# Patient Record
Sex: FEMALE | Race: WHITE | HISPANIC OR LATINO | Employment: FULL TIME | URBAN - METROPOLITAN AREA
[De-identification: names, ages, dates, MRNs, and addresses within clinical notes are randomized per-mention and may not be internally consistent; named-entity substitution may affect disease eponyms.]

---

## 2018-11-08 ENCOUNTER — OFFICE VISIT (OUTPATIENT)
Dept: MEDICAL GROUP | Facility: MEDICAL CENTER | Age: 48
End: 2018-11-08
Payer: COMMERCIAL

## 2018-11-08 VITALS
WEIGHT: 130.51 LBS | SYSTOLIC BLOOD PRESSURE: 118 MMHG | HEIGHT: 65 IN | HEART RATE: 68 BPM | TEMPERATURE: 98.8 F | DIASTOLIC BLOOD PRESSURE: 72 MMHG | BODY MASS INDEX: 21.74 KG/M2 | OXYGEN SATURATION: 99 %

## 2018-11-08 DIAGNOSIS — Z00.00 ANNUAL PHYSICAL EXAM: ICD-10-CM

## 2018-11-08 DIAGNOSIS — Z12.11 SCREENING FOR MALIGNANT NEOPLASM OF COLON: ICD-10-CM

## 2018-11-08 DIAGNOSIS — Z13.6 SCREENING FOR CARDIOVASCULAR CONDITION: ICD-10-CM

## 2018-11-08 DIAGNOSIS — Z13.1 SCREENING FOR DIABETES MELLITUS: ICD-10-CM

## 2018-11-08 DIAGNOSIS — Z00.00 HEALTH CARE MAINTENANCE: ICD-10-CM

## 2018-11-08 DIAGNOSIS — Z12.31 ENCOUNTER FOR SCREENING MAMMOGRAM FOR MALIGNANT NEOPLASM OF BREAST: ICD-10-CM

## 2018-11-08 PROCEDURE — 99386 PREV VISIT NEW AGE 40-64: CPT | Performed by: INTERNAL MEDICINE

## 2018-11-08 ASSESSMENT — PATIENT HEALTH QUESTIONNAIRE - PHQ9: CLINICAL INTERPRETATION OF PHQ2 SCORE: 0

## 2018-11-08 NOTE — PROGRESS NOTES
CHIEF COMPLIANT:  Diane Goins is a 47 y.o. female who presents for annual exam    Recommendations:  Regular exercise at least 4 days a week  Diet: advised balanced diet  Dental exam at least 1-2 times per year  Sunscreen use: advised    Immunizations:  TdaP: Pending records  Influenza vaccine: Advised    Colonoscopy: Pending in 2019    GYN  PAP:   Pending  Abnormal PAP: no  Last Mammography: Pending    Menarche:      Menses every month with bleeding for 3-4 days  No excess cramping or bleeding.   Takes OTC analgesics for cramping    CURRENT MEDICATIONS  No current outpatient prescriptions on file.     No current facility-administered medications for this visit.      ALLERGIES  Allergies: Patient has no known allergies.  PAST MEDICAL HISTORY  She  has a past medical history of No known health problems.  SURGICAL HISTORY  She  has no past surgical history on file.  SOCIAL HISTORY  Social History   Substance Use Topics   • Smoking status: Former Smoker     Packs/day: 1.50     Types: Cigarettes     Start date: 1986     Quit date: 2010   • Smokeless tobacco: Never Used   • Alcohol use Not on file     Social History     Social History Narrative   • No narrative on file     FAMILY HISTORY  Family History   Problem Relation Age of Onset   • Cancer Father      Family Status   Relation Status   • Mo Alive   • Fa    • Sis Alive   • Bro Alive   • Child Alive     REVIEW OF SYSTEMS  Constitutional: Denies fever, chills, or sweats  Skin: negative for rash, scaling, itching, pigmentation, hair or nail changes.  Eyes: negative for visual blurring, double vision, eye pain, floaters and discharge from eyes  ENT: negative for tinnitus, vertigo, bleeding gums, dental problem and hoarseness, frequent URI's, sinus trouble, persistent sore throat  Respiratory: negative for persistent cough, hemoptysis, dyspnea, recurrent pneumonia or bronchitis, asthma and wheezing  Cardiovascular: negative for palpitations,  "tachycardia, irregular heart beat, chest pain, or peripheral edema.  Gastrointestinal: negative for poor appetite, dysphagia, nausea, heartburn or reflux, abdominal pain, hemorrhoids, constipation or diarrhea  Genitourinary: negative for dysuria, frequency, hesitancy, incontinence, sexually transmitted disease, abnormal vaginal discharge/bleeding, dysparunia   Musculoskeletal: negative for joint swelling and muscle pain/ soreness  Neuro: negative for migraine headaches, involuntary movements or tremor  Psychiatric: negative for excessive alcohol consumption or illegal drug use, sleep problems, anxiety, depression, sexual difficulties  Hematologic/Lymphatic/Immunologic: negative for anemia, unusual bruising, swollen glands, HIV risk factors  Endocrine: negative for temperature intolerance, polydipsia, polyuria.     PHYSICAL EXAMINATION:  Blood pressure 118/72, pulse 68, temperature 37.1 °C (98.8 °F), temperature source Temporal, height 1.651 m (5' 5\"), weight 59.2 kg (130 lb 8.2 oz), last menstrual period 10/08/2018, SpO2 99 %.  Body mass index is 21.72 kg/m².  Wt Readings from Last 4 Encounters:   11/08/18 59.2 kg (130 lb 8.2 oz)     General appearance:healthy, well developed, well nourished, well-groomed  Psych: alert, no distress, cooperative. Eye contact good, speech goal directed. Affect calm.   Eyes: conjunctivae and sclerae normal, lids and lashes normal, EOMI, PERRLA  ENT: Ears: external ears normal to inspection, canals clear. TMs pearly gray with normal light reflex and anatomic landmarks, Nose/Sinuses: nose shows no deformity, asymmetry, or inflammation, nasal mucosa normal, no sinus tenderness,   Oropharynx: lips normal without lesions, buccal mucosa normal, gums healthy, teeth intact, non-carious, palate normal, tongue midline and normal  Neck: no asymmetry, masses, adenopathy. Thyroid normal to palpation, carotids without bruits, no jugular venous distention  Lungs: clear to auscultation with good " excursion, Normal respiratory rate. Chest symmetric no chest wall tenderness.  Cardiovascular: Regular rate and rhythm, no murmur.  No peripheral edema  Abdomen:  Soft, non-tender, no masses, HSM or palpable renal abnormalities. Bowel sounds normal, no bruits heard. No CVAT.  Musculoskeletal: no evidence of joint effusion, ROM of all joints is normal, no crepitation detected, strength grossly normal UE and LE, proximal and distal motor groups. No deformities present  Lymphatic: None significantly enlarged supraclavicular, axillary, or inguinal  Skin: color normal, vascularity normal, no rashes or suspicious lesions, no evidence of bleeding or bruising  Neuro: speech normal, mental status intact, gait grossly normal, muscle tone normal.    LABS    Pending    ASSESSMENT/PLAN    1. Annual physical exam  Patient has not have physician for 20 years    2. Health care maintenance  She will schedule appointment for Pap smear  - COMP METABOLIC PANEL; Future  - Lipid Profile; Future  - VITAMIN D,25 HYDROXY; Future  - TSH; Future    3. Encounter for screening mammogram for malignant neoplasm of breast  - MA-SCREEN MAMMO W/CAD-BILAT; Future    4. Screening for malignant neoplasm of colon  During January 2019  - REFERRAL TO GASTROENTEROLOGY    5. Screening for cardiovascular condition  - Lipid Profile; Future    6. Screening for diabetes mellitus  - COMP METABOLIC PANEL; Future    - Advised to check with insurance about the co-pay for labs    Smoking Counseling: Nonsmoker    Next office visit within 2 months for Pap    All questions are answered.     Please note that this dictation was created using voice recognition software. Despite all efforts, some minor grammar mistakes are possible.

## 2018-11-12 ENCOUNTER — HOSPITAL ENCOUNTER (OUTPATIENT)
Dept: RADIOLOGY | Facility: MEDICAL CENTER | Age: 48
End: 2018-11-12
Attending: INTERNAL MEDICINE
Payer: COMMERCIAL

## 2018-11-12 DIAGNOSIS — Z12.31 ENCOUNTER FOR SCREENING MAMMOGRAM FOR MALIGNANT NEOPLASM OF BREAST: ICD-10-CM

## 2018-11-12 PROCEDURE — 77067 SCR MAMMO BI INCL CAD: CPT

## 2018-12-10 ENCOUNTER — HOSPITAL ENCOUNTER (OUTPATIENT)
Dept: LAB | Facility: MEDICAL CENTER | Age: 48
End: 2018-12-10
Attending: INTERNAL MEDICINE
Payer: COMMERCIAL

## 2018-12-10 DIAGNOSIS — Z13.6 SCREENING FOR CARDIOVASCULAR CONDITION: ICD-10-CM

## 2018-12-10 DIAGNOSIS — Z00.00 HEALTH CARE MAINTENANCE: ICD-10-CM

## 2018-12-10 DIAGNOSIS — Z13.1 SCREENING FOR DIABETES MELLITUS: ICD-10-CM

## 2018-12-10 PROCEDURE — 80061 LIPID PANEL: CPT

## 2018-12-10 PROCEDURE — 84443 ASSAY THYROID STIM HORMONE: CPT

## 2018-12-10 PROCEDURE — 82306 VITAMIN D 25 HYDROXY: CPT

## 2018-12-10 PROCEDURE — 80053 COMPREHEN METABOLIC PANEL: CPT

## 2018-12-10 PROCEDURE — 36415 COLL VENOUS BLD VENIPUNCTURE: CPT

## 2018-12-11 LAB
25(OH)D3 SERPL-MCNC: 11 NG/ML (ref 30–100)
ALBUMIN SERPL BCP-MCNC: 4.1 G/DL (ref 3.2–4.9)
ALBUMIN/GLOB SERPL: 1.5 G/DL
ALP SERPL-CCNC: 47 U/L (ref 30–99)
ALT SERPL-CCNC: 10 U/L (ref 2–50)
ANION GAP SERPL CALC-SCNC: 6 MMOL/L (ref 0–11.9)
AST SERPL-CCNC: 16 U/L (ref 12–45)
BILIRUB SERPL-MCNC: 0.5 MG/DL (ref 0.1–1.5)
BUN SERPL-MCNC: 12 MG/DL (ref 8–22)
CALCIUM SERPL-MCNC: 9 MG/DL (ref 8.5–10.5)
CHLORIDE SERPL-SCNC: 104 MMOL/L (ref 96–112)
CHOLEST SERPL-MCNC: 209 MG/DL (ref 100–199)
CO2 SERPL-SCNC: 28 MMOL/L (ref 20–33)
CREAT SERPL-MCNC: 0.71 MG/DL (ref 0.5–1.4)
FASTING STATUS PATIENT QL REPORTED: NORMAL
GLOBULIN SER CALC-MCNC: 2.7 G/DL (ref 1.9–3.5)
GLUCOSE SERPL-MCNC: 92 MG/DL (ref 65–99)
HDLC SERPL-MCNC: 59 MG/DL
LDLC SERPL CALC-MCNC: 135 MG/DL
POTASSIUM SERPL-SCNC: 4.3 MMOL/L (ref 3.6–5.5)
PROT SERPL-MCNC: 6.8 G/DL (ref 6–8.2)
SODIUM SERPL-SCNC: 138 MMOL/L (ref 135–145)
TRIGL SERPL-MCNC: 74 MG/DL (ref 0–149)
TSH SERPL DL<=0.005 MIU/L-ACNC: 2.37 UIU/ML (ref 0.38–5.33)

## 2018-12-19 ENCOUNTER — HOSPITAL ENCOUNTER (OUTPATIENT)
Facility: MEDICAL CENTER | Age: 48
End: 2018-12-19
Attending: INTERNAL MEDICINE
Payer: COMMERCIAL

## 2018-12-19 ENCOUNTER — OFFICE VISIT (OUTPATIENT)
Dept: MEDICAL GROUP | Facility: MEDICAL CENTER | Age: 48
End: 2018-12-19
Payer: COMMERCIAL

## 2018-12-19 VITALS
DIASTOLIC BLOOD PRESSURE: 68 MMHG | SYSTOLIC BLOOD PRESSURE: 118 MMHG | WEIGHT: 136.24 LBS | TEMPERATURE: 97.9 F | BODY MASS INDEX: 22.7 KG/M2 | HEART RATE: 78 BPM | HEIGHT: 65 IN | RESPIRATION RATE: 14 BRPM | OXYGEN SATURATION: 98 %

## 2018-12-19 DIAGNOSIS — E78.00 HYPERCHOLESTEROLEMIA: ICD-10-CM

## 2018-12-19 DIAGNOSIS — Z23 NEED FOR VACCINATION: ICD-10-CM

## 2018-12-19 DIAGNOSIS — Z12.11 SCREENING FOR MALIGNANT NEOPLASM OF COLON: ICD-10-CM

## 2018-12-19 DIAGNOSIS — Z00.00 HEALTH CARE MAINTENANCE: ICD-10-CM

## 2018-12-19 DIAGNOSIS — Z12.39 SCREENING FOR MALIGNANT NEOPLASM OF BREAST: ICD-10-CM

## 2018-12-19 DIAGNOSIS — Z12.4 SCREENING FOR MALIGNANT NEOPLASM OF CERVIX: ICD-10-CM

## 2018-12-19 DIAGNOSIS — E55.9 HYPOVITAMINOSIS D: ICD-10-CM

## 2018-12-19 DIAGNOSIS — Z01.419 WELL FEMALE EXAM WITH ROUTINE GYNECOLOGICAL EXAM: ICD-10-CM

## 2018-12-19 PROCEDURE — 90686 IIV4 VACC NO PRSV 0.5 ML IM: CPT | Performed by: INTERNAL MEDICINE

## 2018-12-19 PROCEDURE — 90715 TDAP VACCINE 7 YRS/> IM: CPT | Performed by: INTERNAL MEDICINE

## 2018-12-19 PROCEDURE — 90471 IMMUNIZATION ADMIN: CPT | Performed by: INTERNAL MEDICINE

## 2018-12-19 PROCEDURE — 99000 SPECIMEN HANDLING OFFICE-LAB: CPT | Performed by: INTERNAL MEDICINE

## 2018-12-19 PROCEDURE — 99396 PREV VISIT EST AGE 40-64: CPT | Performed by: INTERNAL MEDICINE

## 2018-12-19 PROCEDURE — 99214 OFFICE O/P EST MOD 30 MIN: CPT | Mod: 25 | Performed by: INTERNAL MEDICINE

## 2018-12-19 PROCEDURE — 88175 CYTOPATH C/V AUTO FLUID REDO: CPT

## 2018-12-19 PROCEDURE — 87624 HPV HI-RISK TYP POOLED RSLT: CPT

## 2018-12-19 PROCEDURE — 90472 IMMUNIZATION ADMIN EACH ADD: CPT | Performed by: INTERNAL MEDICINE

## 2018-12-19 RX ORDER — ERGOCALCIFEROL 1.25 MG/1
50000 CAPSULE ORAL
Qty: 12 CAP | Refills: 1 | Status: SHIPPED | OUTPATIENT
Start: 2018-12-19 | End: 2019-05-23 | Stop reason: SDUPTHER

## 2018-12-19 NOTE — PROGRESS NOTES
CHIEF COMPLIANT:  Diane Goins is a 47 y.o. female who presents to review labs for annual exam    Pt has GYN provider: no    Recommendations:  Regular exercise at least 4 days a week  Diet: advised balanced diet  Dental exam at least 1-2 times per year  Sunscreen use: advised    Immunizations:  TdaP: 2018  Flu vaccine: 2018Influenza    Colonoscopy: Given order  Bone density test:   Gardiasil:     GYN  Last PAP:   remote, normal   Abnormal PAP: no  Last Mammography: 2018  Self breast exam: advised    Postmenopausal.  Denies hot flushes, sweating, vaginal dryness, mood swings.     Hypercholesterolemia  New problem.  The patient had slightly elevated cholesterol, no medications.   Diet: Healthy  Exercise: At least 4 days in a week  BMI: 22  FH: Adopted    Hypovitaminosis D  New problem.  The patient had a low vitamin D level at 11.   No vitamin D supplement.    CURRENT MEDICATIONS  Current Outpatient Prescriptions   Medication Sig Dispense Refill   • vitamin D, Ergocalciferol, (DRISDOL) 11933 units Cap capsule Take 1 Cap by mouth every 7 days. 12 Cap 1     No current facility-administered medications for this visit.      ALLERGIES  Allergies: Patient has no known allergies.  PAST MEDICAL HISTORY  She  has a past medical history of No known health problems.  SURGICAL HISTORY  She  has no past surgical history on file.  SOCIAL HISTORY  Social History   Substance Use Topics   • Smoking status: Former Smoker     Packs/day: 1.50     Types: Cigarettes     Start date: 1986     Quit date: 2010   • Smokeless tobacco: Never Used   • Alcohol use Not on file     Social History     Social History Narrative   • No narrative on file     FAMILY HISTORY  Family History   Problem Relation Age of Onset   • Cancer Father      Family Status   Relation Status   • Mo Alive   • Fa    • Sis Alive   • Bro Alive   • Child Alive     REVIEW OF SYSTEMS  Constitutional: Denies fever, chills, or sweats  Skin:  "negative for rash, scaling, itching, pigmentation, hair or nail changes.  Eyes: negative for visual blurring, double vision, eye pain, floaters and discharge from eyes  ENT: negative for tinnitus, vertigo, bleeding gums, dental problem and hoarseness, frequent URI's, sinus trouble, persistent sore throat  Respiratory: negative for persistent cough, hemoptysis, dyspnea, recurrent pneumonia or bronchitis, asthma and wheezing  Cardiovascular: negative for palpitations, tachycardia, irregular heart beat, chest pain, or peripheral edema.  Gastrointestinal: negative for poor appetite, dysphagia, nausea, heartburn or reflux, abdominal pain, hemorrhoids, constipation or diarrhea  Genitourinary: negative for dysuria, frequency, hesitancy, incontinence, sexually transmitted disease, abnormal vaginal discharge/bleeding, dysparunia   Musculoskeletal: negative for joint swelling and muscle pain/ soreness  Neuro: negative for migraine headaches, involuntary movements or tremor  Psychiatric: negative for excessive alcohol consumption or illegal drug use, sleep problems, anxiety, depression, sexual difficulties  Hematologic/Lymphatic/Immunologic: negative for anemia, unusual bruising, swollen glands, HIV risk factors  Endocrine: negative for temperature intolerance, polydipsia, polyuria.     PHYSICAL EXAMINATION:  Blood pressure 118/68, pulse 78, temperature 36.6 °C (97.9 °F), temperature source Temporal, resp. rate 14, height 1.651 m (5' 5\"), weight 61.8 kg (136 lb 3.9 oz), last menstrual period 10/15/2018, SpO2 98 %.  Body mass index is 22.67 kg/m².  Wt Readings from Last 4 Encounters:   12/19/18 61.8 kg (136 lb 3.9 oz)   11/08/18 59.2 kg (130 lb 8.2 oz)   General appearance:healthy, well developed, well nourished, well-groomed  Psych: alert, no distress, cooperative. Eye contact good, speech goal directed. Affect calm.   Eyes: conjunctivae and sclerae normal, lids and lashes normal, EOMI, PERRLA  ENT: Ears: external ears normal " to inspection, canals clear. TMs pearly gray with normal light reflex and anatomic landmarks, Nose/Sinuses: nose shows no deformity, asymmetry, or inflammation, nasal mucosa normal, no sinus tenderness,   Oropharynx: lips normal without lesions, buccal mucosa normal, gums healthy, teeth intact, non-carious, palate normal, tongue midline and normal  Neck: no asymmetry, masses, adenopathy. Thyroid normal to palpation, carotids without bruits, no jugular venous distention  Lungs: clear to auscultation with good excursion, Normal respiratory rate. Chest symmetric no chest wall tenderness.  Cardiovascular: Regular rate and rhythm, no murmur.  No peripheral edema  Abdomen:  Soft, non-tender, no masses, HSM or palpable renal abnormalities. Bowel sounds normal, no bruits heard. No CVAT.  Musculoskeletal: no evidence of joint effusion, ROM of all joints is normal, no crepitation detected, strength grossly normal UE and LE, proximal and distal motor groups. No deformities present  Lymphatic: None significantly enlarged supraclavicular, axillary, or inguinal  Skin: color normal, vascularity normal, no rashes or suspicious lesions, no evidence of bleeding or bruising  Neuro: speech normal, mental status intact, gait grossly normal, muscle tone normal.  GYN: No suprapubic tenderness.  Perineum and external genitalia normal without rash.   Vagina with without discharge, normal mucosa.  Cervix w/o visible lesions or discharge. No CMT  Uterus normal size, non-tender, no masses,   Adnexa w/o mass or tenderness.   PAP smear was obtained.    LABS    Labs are reviewed and discussed with a patient  Lab Results   Component Value Date/Time    CHOLSTRLTOT 209 (H) 12/10/2018 08:08 AM     (H) 12/10/2018 08:08 AM    HDL 59 12/10/2018 08:08 AM    TRIGLYCERIDE 74 12/10/2018 08:08 AM       Lab Results   Component Value Date/Time    SODIUM 138 12/10/2018 08:08 AM    POTASSIUM 4.3 12/10/2018 08:08 AM    CHLORIDE 104 12/10/2018 08:08 AM     CO2 28 12/10/2018 08:08 AM    GLUCOSE 92 12/10/2018 08:08 AM    BUN 12 12/10/2018 08:08 AM    CREATININE 0.71 12/10/2018 08:08 AM     Lab Results   Component Value Date/Time    ALKPHOSPHAT 47 12/10/2018 08:08 AM    ASTSGOT 16 12/10/2018 08:08 AM    ALTSGPT 10 12/10/2018 08:08 AM    TBILIRUBIN 0.5 12/10/2018 08:08 AM       Ref. Range 12/10/2018    25-OF  Vitamin D 25 30 - 100 ng/mL 11 (L)   TSH  0.380 - 5.330 uIU/mL 2.370     ASSESSMENT/PLAN    1. Well female exam with routine gynecological exam  - THINPREP PAP WITH HPV; Future    2. Screening for malignant neoplasm of cervix  - THINPREP PAP WITH HPV; Future    3. Screening for malignant neoplasm of breast  - MA-SCREEN MAMMO W/CAD-BILAT; Future    4. Screening for malignant neoplasm of colon  - REFERRAL TO GASTROENTEROLOGY    5. Need for vaccination  - Flu Quad Inj >3 Year Pre-Filled (Preservative Free)  - Tdap =>6yo IM  Information was provided to the patient regarding the vaccine, including side effects. Vaccine was given by my medical assistant under my supervision.    6. Health care maintenance  As above    7. Hypercholesterolemia  Advised low-calorie diet, daily exercise, weight control  - COMP METABOLIC PANEL; Future  - Lipid Profile; Future    8. Hypovitaminosis D  Start:  - vitamin D, Ergocalciferol, (DRISDOL) 10142 units Cap capsule; Take 1 Cap by mouth every 7 days.  Dispense: 12 Cap; Refill: 1    Smoking Counseling: Nonsmoker    Next office visit is due in  1 year and prn    All questions are answered.     Please note that this dictation was created using voice recognition software. Despite all efforts, some minor grammar mistakes are possible.

## 2018-12-20 DIAGNOSIS — Z12.4 SCREENING FOR MALIGNANT NEOPLASM OF CERVIX: ICD-10-CM

## 2018-12-20 DIAGNOSIS — Z01.419 WELL FEMALE EXAM WITH ROUTINE GYNECOLOGICAL EXAM: ICD-10-CM

## 2018-12-21 LAB
CYTOLOGY REG CYTOL: NORMAL
HPV HR 12 DNA CVX QL NAA+PROBE: NEGATIVE
HPV16 DNA SPEC QL NAA+PROBE: NEGATIVE
HPV18 DNA SPEC QL NAA+PROBE: NEGATIVE
SPECIMEN SOURCE: NORMAL

## 2019-05-23 DIAGNOSIS — E55.9 HYPOVITAMINOSIS D: ICD-10-CM

## 2019-05-23 RX ORDER — ERGOCALCIFEROL 1.25 MG/1
CAPSULE ORAL
Qty: 4 CAP | Refills: 0 | Status: SHIPPED | OUTPATIENT
Start: 2019-05-23 | End: 2019-06-17 | Stop reason: SDUPTHER

## 2019-06-06 ENCOUNTER — OFFICE VISIT (OUTPATIENT)
Dept: MEDICAL GROUP | Facility: MEDICAL CENTER | Age: 49
End: 2019-06-06
Payer: COMMERCIAL

## 2019-06-06 VITALS
HEART RATE: 67 BPM | BODY MASS INDEX: 23.62 KG/M2 | DIASTOLIC BLOOD PRESSURE: 70 MMHG | OXYGEN SATURATION: 97 % | SYSTOLIC BLOOD PRESSURE: 116 MMHG | TEMPERATURE: 98.7 F | HEIGHT: 65 IN | WEIGHT: 141.76 LBS
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
## 2019-06-06 DIAGNOSIS — M62.838 MUSCLE SPASM: ICD-10-CM

## 2019-06-06 DIAGNOSIS — M79.10 MYALGIA: ICD-10-CM

## 2019-06-06 PROCEDURE — 99214 OFFICE O/P EST MOD 30 MIN: CPT | Performed by: INTERNAL MEDICINE

## 2019-06-06 RX ORDER — PREDNISONE 20 MG/1
TABLET ORAL
Qty: 7 TAB | Refills: 0 | Status: SHIPPED | OUTPATIENT
Start: 2019-06-06 | End: 2019-09-20

## 2019-06-06 RX ORDER — TIZANIDINE 4 MG/1
4 TABLET ORAL EVERY 6 HOURS PRN
Qty: 30 TAB | Refills: 3 | Status: SHIPPED | OUTPATIENT
Start: 2019-06-06 | End: 2019-09-20

## 2019-06-06 RX ORDER — MELOXICAM 15 MG/1
15 TABLET ORAL DAILY
Qty: 60 TAB | Refills: 0 | Status: SHIPPED | OUTPATIENT
Start: 2019-06-06 | End: 2019-09-20

## 2019-06-06 ASSESSMENT — PATIENT HEALTH QUESTIONNAIRE - PHQ9: CLINICAL INTERPRETATION OF PHQ2 SCORE: 0

## 2019-06-06 NOTE — PROGRESS NOTES
CHIEF COMPLAINT  Chief Complaint   Patient presents with   • Arm Pain     x 6 months     HPI  Patient is a 48 y.o. female patient who presents today for the following     Muscle pain in R shoulder  - Onset: 12/2018, ~ 6 months  - Triger: strenuous exercise  - located in: muscles pf the R shoulder/upper lateral arm.  - intensity:  Up to 9/10  - quality:  dull and sharp   - radiation:  no  - alleviating factors are:  rest, ibuprofen ocasionally  -  exacerbating factors are:  activity  - accompanied: no numbness, weakness, tingling, fever, chills  - course:   - imaging:   - treatment: as above    Reviewed PMH, PSH, FH, SH, ALL, HCM/IMM, no changes  Reviewed MEDS, no changes    Patient Active Problem List    Diagnosis Date Noted   • Hypercholesterolemia 12/19/2018   • Hypovitaminosis D 12/19/2018   • Annual physical exam 11/08/2018   • Health care maintenance 11/08/2018     CURRENT MEDICATIONS  Current Outpatient Prescriptions   Medication Sig Dispense Refill   • meloxicam (MOBIC) 15 MG tablet Take 1 Tab by mouth every day. 60 Tab 0   • tizanidine (ZANAFLEX) 4 MG Tab Take 1 Tab by mouth every 6 hours as needed. 30 Tab 3   • predniSONE (DELTASONE) 20 MG Tab Take 1 tab daily after breakfast 7 Tab 0   • vitamin D, Ergocalciferol, (DRISDOL) 84445 units Cap capsule TAKE ONE CAPSULE BY MOUTH EVERY 7 DAYS 4 Cap 0     No current facility-administered medications for this visit.      ALLERGIES  Allergies: Patient has no known allergies.  PAST MEDICAL HISTORY  Past Medical History:   Diagnosis Date   • No known health problems      SURGICAL HISTORY  She  has no past surgical history on file.  SOCIAL HISTORY  Social History   Substance Use Topics   • Smoking status: Former Smoker     Packs/day: 1.50     Types: Cigarettes     Start date: 11/8/1986     Quit date: 11/8/2010   • Smokeless tobacco: Never Used   • Alcohol use No     Social History     Social History Narrative   • No narrative on file     FAMILY HISTORY  Family History  "  Problem Relation Age of Onset   • Adopted: Yes   • Cancer Father      Family Status   Relation Status   • Mo Alive   • Fa    • Sis Alive   • Bro Alive   • Child Alive     ROS   Constitutional: Negative for fever, chills.  Eyes: Negative for blurred vision.   Respiratory: Negative for cough, shortness of breath.  Cardiovascular: Negative for chest pain, palpitations.   Gastrointestinal: Negative for heartburn, nausea, abdominal pain.   Musculoskeletal: as above.  Skin: Negative for rash.   Neuro: Negative for dizziness, weakness and headaches.   Endo/Heme/Allergies: Does not bruise/bleed easily.   Psychiatric/Behavioral: Negative for depression.    PHYSICAL EXAM   /70 (BP Location: Left arm, Patient Position: Sitting, BP Cuff Size: Adult)   Pulse 67   Temp 37.1 °C (98.7 °F) (Temporal)   Ht 1.651 m (5' 5\")   Wt 64.3 kg (141 lb 12.1 oz)   SpO2 97%  Body mass index is 23.59 kg/m².  General:  NAD, well appearing  HEENT:   NC/AT, PERRLA, EOMI, TMs are clear. Oropharyngeal mucosa is pink,  without lesions;  no cervical / supraclavicular  lymphadenopathy, no thyromegaly.    Cardiovascular: RRR.   No m/r/g. No carotid bruits .      Lungs:   CTAB, no w/r/r, no respiratory distress.  Abdomen: Soft, NT/ND + BS; no suprapubic tenderness; no masses or hepatosplenomegaly.  Extremities:  2+ DP and radial pulses bilaterally.  No c/c/e.   Right shoulder: Decreased posterior rotation in the right shoulder due to pain.  No redness or swelling. TTP over the R triceps.  Skin:  Warm, dry.  No erythema. No rash.   Neurologic: Alert & oriented x 3. CN II-XII grossly intact. Brachioradialis / knee DTR are 2/4, symmetric. Strength and sensation grossly intact.  No focal deficits.  Psychiatric:  Affect normal, mood normal, judgment normal.    LABS     Labs are reviewed and discussed with a patient  Lab Results   Component Value Date/Time    CHOLSTRLTOT 209 (H) 12/10/2018 08:08 AM     (H) 12/10/2018 08:08 AM    HDL " 59 12/10/2018 08:08 AM    TRIGLYCERIDE 74 12/10/2018 08:08 AM       Lab Results   Component Value Date/Time    SODIUM 138 12/10/2018 08:08 AM    POTASSIUM 4.3 12/10/2018 08:08 AM    CHLORIDE 104 12/10/2018 08:08 AM    CO2 28 12/10/2018 08:08 AM    GLUCOSE 92 12/10/2018 08:08 AM    BUN 12 12/10/2018 08:08 AM    CREATININE 0.71 12/10/2018 08:08 AM     Lab Results   Component Value Date/Time    ALKPHOSPHAT 47 12/10/2018 08:08 AM    ASTSGOT 16 12/10/2018 08:08 AM    ALTSGPT 10 12/10/2018 08:08 AM    TBILIRUBIN 0.5 12/10/2018 08:08 AM      IMAGING     None    ASSESMENT AND PLAN        1. Myalgia  Advised to continue activity as tolerated  - REFERRAL TO PHYSICAL THERAPY Reason for Therapy: Eval/Treat/Report  - meloxicam (MOBIC) 15 MG tablet; Take 1 Tab by mouth every day.  Dispense: 60 Tab; Refill: 0  - tizanidine (ZANAFLEX) 4 MG Tab; Take 1 Tab by mouth every 6 hours as needed.  Dispense: 30 Tab; Refill: 3  2. Muscle spasm  - REFERRAL TO PHYSICAL THERAPY Reason for Therapy: Eval/Treat/Report  - meloxicam (MOBIC) 15 MG tablet; Take 1 Tab by mouth every day.  Dispense: 60 Tab; Refill: 0  - tizanidine (ZANAFLEX) 4 MG Tab; Take 1 Tab by mouth every 6 hours as needed.  Dispense: 30 Tab; Refill: 3  3. Overuse injury  - REFERRAL TO PHYSICAL THERAPY Reason for Therapy: Eval/Treat/Report  - meloxicam (MOBIC) 15 MG tablet; Take 1 Tab by mouth every day.  Dispense: 60 Tab; Refill: 0  - tizanidine (ZANAFLEX) 4 MG Tab; Take 1 Tab by mouth every 6 hours as needed.  Dispense: 30 Tab; Refill: 3    Counseling:   - Smoking:  Nonsmoker    Followup: Return if symptoms worsen or fail to improve.    All questions are answered.    Please note that this dictation was created using voice recognition software, and that there might be errors of rhina and possibly content.

## 2019-06-17 DIAGNOSIS — E55.9 HYPOVITAMINOSIS D: ICD-10-CM

## 2019-06-17 RX ORDER — ERGOCALCIFEROL 1.25 MG/1
CAPSULE ORAL
Qty: 4 CAP | Refills: 0 | Status: SHIPPED | OUTPATIENT
Start: 2019-06-17 | End: 2019-07-15 | Stop reason: SDUPTHER

## 2019-06-17 NOTE — TELEPHONE ENCOUNTER
Was the patient seen in the last year in this department? Yes LOV:06/06/2019     Does patient have an active prescription for medications requested? Yes    Received Request Via: Pharmacy     VITAMIN D2 1.25MG(50,000 UNIT)

## 2019-07-15 DIAGNOSIS — E55.9 HYPOVITAMINOSIS D: ICD-10-CM

## 2019-07-15 RX ORDER — ERGOCALCIFEROL 1.25 MG/1
CAPSULE ORAL
Qty: 4 CAP | Refills: 0 | Status: SHIPPED | OUTPATIENT
Start: 2019-07-15 | End: 2019-08-15 | Stop reason: SDUPTHER

## 2019-08-15 DIAGNOSIS — E55.9 HYPOVITAMINOSIS D: ICD-10-CM

## 2019-08-15 RX ORDER — ERGOCALCIFEROL 1.25 MG/1
CAPSULE ORAL
Qty: 4 CAP | Refills: 0 | Status: SHIPPED | OUTPATIENT
Start: 2019-08-15 | End: 2019-10-08 | Stop reason: SDUPTHER

## 2019-09-20 ENCOUNTER — OFFICE VISIT (OUTPATIENT)
Dept: URGENT CARE | Facility: CLINIC | Age: 49
End: 2019-09-20
Payer: COMMERCIAL

## 2019-09-20 ENCOUNTER — APPOINTMENT (OUTPATIENT)
Dept: RADIOLOGY | Facility: IMAGING CENTER | Age: 49
End: 2019-09-20
Attending: FAMILY MEDICINE
Payer: COMMERCIAL

## 2019-09-20 VITALS
DIASTOLIC BLOOD PRESSURE: 72 MMHG | OXYGEN SATURATION: 99 % | SYSTOLIC BLOOD PRESSURE: 112 MMHG | TEMPERATURE: 98.5 F | RESPIRATION RATE: 12 BRPM | BODY MASS INDEX: 24.19 KG/M2 | HEART RATE: 85 BPM | HEIGHT: 65 IN | WEIGHT: 145.2 LBS

## 2019-09-20 DIAGNOSIS — M70.21 OLECRANON BURSITIS OF RIGHT ELBOW: ICD-10-CM

## 2019-09-20 DIAGNOSIS — M79.601 PAIN OF RIGHT UPPER EXTREMITY: ICD-10-CM

## 2019-09-20 DIAGNOSIS — M62.830 BACK MUSCLE SPASM: ICD-10-CM

## 2019-09-20 PROCEDURE — 73060 X-RAY EXAM OF HUMERUS: CPT | Mod: TC,RT | Performed by: FAMILY MEDICINE

## 2019-09-20 PROCEDURE — 99214 OFFICE O/P EST MOD 30 MIN: CPT | Performed by: FAMILY MEDICINE

## 2019-09-20 RX ORDER — CELECOXIB 200 MG/1
200 CAPSULE ORAL DAILY
Qty: 14 CAP | Refills: 0 | Status: SHIPPED | OUTPATIENT
Start: 2019-09-20 | End: 2021-12-15

## 2019-09-20 RX ORDER — CYCLOBENZAPRINE HCL 10 MG
10 TABLET ORAL 3 TIMES DAILY PRN
Qty: 30 TAB | Refills: 0 | Status: SHIPPED | OUTPATIENT
Start: 2019-09-20 | End: 2021-12-15

## 2019-09-20 RX ORDER — IBUPROFEN 200 MG
200 TABLET ORAL EVERY 6 HOURS PRN
COMMUNITY
End: 2019-09-20

## 2019-10-01 ENCOUNTER — OFFICE VISIT (OUTPATIENT)
Dept: MEDICAL GROUP | Facility: CLINIC | Age: 49
End: 2019-10-01
Payer: COMMERCIAL

## 2019-10-01 VITALS
OXYGEN SATURATION: 97 % | SYSTOLIC BLOOD PRESSURE: 118 MMHG | RESPIRATION RATE: 16 BRPM | DIASTOLIC BLOOD PRESSURE: 80 MMHG | BODY MASS INDEX: 24.19 KG/M2 | WEIGHT: 145.2 LBS | TEMPERATURE: 98.7 F | HEART RATE: 88 BPM | HEIGHT: 65 IN

## 2019-10-01 DIAGNOSIS — M75.01 ADHESIVE CAPSULITIS OF RIGHT SHOULDER: ICD-10-CM

## 2019-10-01 DIAGNOSIS — M70.21 OLECRANON BURSITIS, RIGHT ELBOW: ICD-10-CM

## 2019-10-01 PROCEDURE — 20610 DRAIN/INJ JOINT/BURSA W/O US: CPT | Mod: RT | Performed by: FAMILY MEDICINE

## 2019-10-01 PROCEDURE — 99203 OFFICE O/P NEW LOW 30 MIN: CPT | Mod: 25 | Performed by: FAMILY MEDICINE

## 2019-10-01 RX ORDER — TRIAMCINOLONE ACETONIDE 40 MG/ML
40 INJECTION, SUSPENSION INTRA-ARTICULAR; INTRAMUSCULAR ONCE
Status: COMPLETED | OUTPATIENT
Start: 2019-10-01 | End: 2019-10-01

## 2019-10-01 RX ADMIN — TRIAMCINOLONE ACETONIDE 40 MG: 40 INJECTION, SUSPENSION INTRA-ARTICULAR; INTRAMUSCULAR at 16:49

## 2019-10-01 NOTE — PROGRESS NOTES
CHIEF COMPLAINT:  Diane Goins female presenting at the request of Russel Long M.D. for evaluation of Shoulder pain.     Diane Goins is complaining of right shoulder pain  Initial onset of symptoms back in January 2019  No specific injury, but she attributes symptoms to starting after she was holding onto the treadmill for activity  Pain is at the deltoid region and Upper arm region  Quality is aching, sharp  Pain is Radiating down the arm and a rotator cuff distribution  Aggravated by overhead activity, reaching back and Sudden movements  Improved with  rest   no prior problems with this shoulder in the past  Prior Treatments: Seen in urgent care  Prior studies: X-Ray   Medications tried for pain include: Oral steroids from PCP which helped  Mechanical Symptom history: No Locking and Popping/grinding which is not necessarily painful  POSITIVE night symptoms    Denies cervical spine pain  Also has RIGHT olecranon region swelling which she attributes to occurring after she inadvertently slammed her elbow on a podium    Works as a , occasionally when she is pushing days she feels a sharp twinge in the shoulder  Treadmill, yoga    REVIEW OF SYSTEMS  No Nausea, No Vomiting, No Chest Pain, No Shortness of Breath, No Dizziness, No Headache    PAST MEDICAL HISTORY:   History reviewed. No pertinent past medical history.    PMH:  has a past medical history of No known health problems.  MEDS:   Current Outpatient Medications:   •  celecoxib (CELEBREX) 200 MG Cap, Take 1 Cap by mouth every day., Disp: 14 Cap, Rfl: 0  •  cyclobenzaprine (FLEXERIL) 10 MG Tab, Take 1 Tab by mouth 3 times a day as needed., Disp: 30 Tab, Rfl: 0  •  ergocalciferol (DRISDOL) 17270 UNIT capsule, TAKE ONE CAPSULE BY MOUTH EVERY 7 DAYS, Disp: 4 Cap, Rfl: 0  ALLERGIES: No Known Allergies  SURGHX: No past surgical history on file.  SOCHX:  reports that she quit smoking about 8 years ago. Her smoking use included  "cigarettes. She started smoking about 32 years ago. She smoked 1.50 packs per day. She has never used smokeless tobacco. She reports that she does not drink alcohol or use drugs.  FH: Family history was reviewed, no pertinent findings to report     PHYSICAL EXAM:  /80 (BP Location: Left arm, Patient Position: Sitting, BP Cuff Size: Adult)   Pulse 88   Temp 37.1 °C (98.7 °F) (Temporal)   Resp 16   Ht 1.651 m (5' 5\")   Wt 65.9 kg (145 lb 3.2 oz)   SpO2 97%   BMI 24.16 kg/m²      well-developed, well-nourished in no apparent distress, alert and oriented x 3.  Gait: normal    Cervical spine:  Range of motion Slightly limited with Lateral rotation  Spurling's testing is NEGATIVE but there is some local cervical spine discomfort  Cervical spine tenderness NEGATIVE    Strength testing:     Deltoid, bilateral 5/5  Bicep, bilateral 5/5  Tricep, bilateral 5/5  Wrist Extension, bilateral 5/5  Wrist Flexion, bilateral 5/5  Finger Abduction, bilateral 5/5    Sensation:  DECREASED on the right at the level of C5         Reflexes:   Biceps: R 2+/L 2+  Triceps: R 2+/L  2+  Brachial radialis R 2+/L  2+  Kaufman's testing is NEGATIVE  The arms are otherwise neurovascularly intact     Shoulder Exam:    RIGHT Shoulder:  No visible swelling   Range of motion LIMITED with abduction and external rotation  Tenderness: Non-tender  Empty Can Testing 5/5  Internal Rotation 5/5  External Rotation 5/5  Lift Off Testing 5/5  Impingement testing Tse  NEGATIVE  Neer's testing NEGATIVE  Apprehension testing POSITIVE    LEFT Shoulder:  No visible swelling   Range of motion INTACT  Tenderness: Non-tender  Empty Can Testing 5/5  Internal Rotation 5/5  External Rotation 5/5  Lift Off Testing 5/5  Impingement testing Tse  NEGATIVE  Neer's testing NEGATIVE  Apprehension testing NEGATIVE    Additional Findings: Flexed Posture      1. Adhesive capsulitis of right shoulder  triamcinolone acetonide (KENALOG-40) injection 40 mg   2. " Olecranon bursitis, right elbow       Adhesive capsulitis of the RIGHT shoulder  RIGHT subacromial corticosteroid injection performed in the office TODAY (October 1, 2019)    Regarding her RIGHT olecranon bursitis  Seems to be getting smaller mid  Nontender  No erythema    Return in about 4 weeks (around 10/29/2019).  To see how she is doing after RIGHT subacromial corticosteroid injection to see if she started formal physical therapy        9/20/2019 9:31 PM    HISTORY/REASON FOR EXAM: Atraumatic Pain/Swelling/Deformity    TECHNIQUE/EXAM DESCRIPTION:  AP, and lateral views of the RIGHT humerus.    COMPARISON:  None    FINDINGS:    The bony structures and articulations appear within normal limits without visualized fracture, subluxation, or dislocation.      Impression         1.  No acute traumatic bony injury.     done elsewhere and reviewed independently by me    Thank you Russel Long M.D. for allowing me to participate in caring for your patient.

## 2019-10-01 NOTE — PROCEDURES
PROCEDURE NOTE:  right Shoulder subacromial injection  Risks and benefits discussed  Informed consent obtained  Shoulder prepped in sterile fashion utilizing a posterior approach  40 mg of Kenalog and 5 cc of bupivacaine injected into the subacromial space  Vapocoolant spray was utilized  Patient tolerated the procedure well  Postprocedure care and red flags discussed

## 2019-10-08 DIAGNOSIS — E55.9 HYPOVITAMINOSIS D: ICD-10-CM

## 2019-10-08 RX ORDER — ERGOCALCIFEROL 1.25 MG/1
50000 CAPSULE ORAL
Qty: 12 CAP | Refills: 0 | Status: SHIPPED | OUTPATIENT
Start: 2019-10-08 | End: 2021-12-15

## 2019-11-19 ENCOUNTER — OFFICE VISIT (OUTPATIENT)
Dept: MEDICAL GROUP | Facility: CLINIC | Age: 49
End: 2019-11-19
Payer: COMMERCIAL

## 2019-11-19 VITALS
WEIGHT: 145.2 LBS | TEMPERATURE: 98.6 F | OXYGEN SATURATION: 97 % | RESPIRATION RATE: 16 BRPM | HEART RATE: 78 BPM | SYSTOLIC BLOOD PRESSURE: 118 MMHG | BODY MASS INDEX: 24.19 KG/M2 | DIASTOLIC BLOOD PRESSURE: 80 MMHG | HEIGHT: 65 IN

## 2019-11-19 DIAGNOSIS — M70.21 OLECRANON BURSITIS, RIGHT ELBOW: ICD-10-CM

## 2019-11-19 DIAGNOSIS — M75.01 ADHESIVE CAPSULITIS OF RIGHT SHOULDER: ICD-10-CM

## 2019-11-19 PROCEDURE — 99213 OFFICE O/P EST LOW 20 MIN: CPT | Performed by: FAMILY MEDICINE

## 2019-11-19 NOTE — PROGRESS NOTES
"CHIEF COMPLAINT:  Diane Goins female presenting at the request of Russel Long M.D. for evaluation of Shoulder pain.     FOLLOW UP for right shoulder pain  Initial onset of symptoms back in January 2019  No specific injury, but she attributes symptoms to starting after she was holding onto the treadmill for activity  IMPROVED since her corticosteroid injection  She still having some periscapular discomfort on the LEFT side    Denies cervical spine pain  Also has RIGHT olecranon region swelling which she attributes to occurring after she inadvertently slammed her elbow on a podium    Works as a , occasionally when she is pushing days she feels a sharp twinge in the shoulder  Treadmill, yoga     PHYSICAL EXAM:  /80 (BP Location: Left arm, Patient Position: Sitting, BP Cuff Size: Adult)   Pulse 78   Temp 37 °C (98.6 °F) (Temporal)   Resp 16   Ht 1.651 m (5' 5\")   Wt 65.9 kg (145 lb 3.2 oz)   SpO2 97%   BMI 24.16 kg/m²      well-developed, well-nourished in no apparent distress, alert and oriented x 3.  Gait: normal    Cervical spine:  Range of motion Slightly limited with Lateral rotation  Spurling's testing is NEGATIVE but there is some local cervical spine discomfort  Cervical spine tenderness NEGATIVE    Sensation:  DECREASED on the right at the level of C5         RIGHT Shoulder:  No visible swelling   Range of motion INTACT  Tenderness: Non-tender  Empty Can Testing 5/5  Internal Rotation 5/5  External Rotation 5/5  Lift Off Testing 5/5  Impingement testing Tse  NEGATIVE  Neer's testing NEGATIVE    LEFT Shoulder:  No visible swelling   Range of motion INTACT  Tenderness: Non-tender  Empty Can Testing 5/5  Internal Rotation 5/5  External Rotation 5/5  Lift Off Testing 5/5  Impingement testing Tse  NEGATIVE  Neer's testing NEGATIVE    Additional Findings: Flexed Posture      1. Adhesive capsulitis of right shoulder     2. Olecranon bursitis, right elbow       Adhesive " capsulitis of the RIGHT shoulder  RIGHT subacromial corticosteroid injection (October 1, 2019) HELPED    Regarding her RIGHT olecranon bursitis  RESOLVED    Follow-up as needed      9/20/2019 9:31 PM    HISTORY/REASON FOR EXAM: Atraumatic Pain/Swelling/Deformity    TECHNIQUE/EXAM DESCRIPTION:  AP, and lateral views of the RIGHT humerus.    COMPARISON:  None    FINDINGS:    The bony structures and articulations appear within normal limits without visualized fracture, subluxation, or dislocation.      Impression         1.  No acute traumatic bony injury.     Thank you Russel Long M.D. for allowing me to participate in caring for your patient.

## 2020-05-20 ENCOUNTER — HOSPITAL ENCOUNTER (OUTPATIENT)
Facility: MEDICAL CENTER | Age: 50
End: 2020-05-20
Payer: COMMERCIAL

## 2020-05-23 LAB
SARS-COV-2 RNA SPEC QL NAA+PROBE: NOT DETECTED
SPECIMEN SOURCE: NORMAL

## 2020-10-15 ENCOUNTER — IMMUNIZATION (OUTPATIENT)
Dept: SOCIAL WORK | Facility: CLINIC | Age: 50
End: 2020-10-15
Payer: COMMERCIAL

## 2020-10-15 DIAGNOSIS — Z23 NEED FOR VACCINATION: ICD-10-CM

## 2020-10-15 PROCEDURE — 90686 IIV4 VACC NO PRSV 0.5 ML IM: CPT | Performed by: REGISTERED NURSE

## 2020-10-15 PROCEDURE — 90471 IMMUNIZATION ADMIN: CPT | Performed by: REGISTERED NURSE

## 2021-12-04 ENCOUNTER — OFFICE VISIT (OUTPATIENT)
Dept: URGENT CARE | Facility: CLINIC | Age: 51
End: 2021-12-04
Payer: COMMERCIAL

## 2021-12-04 ENCOUNTER — APPOINTMENT (OUTPATIENT)
Dept: RADIOLOGY | Facility: IMAGING CENTER | Age: 51
End: 2021-12-04
Attending: FAMILY MEDICINE
Payer: COMMERCIAL

## 2021-12-04 VITALS
HEIGHT: 65 IN | WEIGHT: 150 LBS | OXYGEN SATURATION: 96 % | HEART RATE: 82 BPM | SYSTOLIC BLOOD PRESSURE: 108 MMHG | TEMPERATURE: 98.3 F | BODY MASS INDEX: 24.99 KG/M2 | RESPIRATION RATE: 16 BRPM | DIASTOLIC BLOOD PRESSURE: 70 MMHG

## 2021-12-04 DIAGNOSIS — R22.1 NECK MASS: ICD-10-CM

## 2021-12-04 DIAGNOSIS — M54.2 NECK PAIN: ICD-10-CM

## 2021-12-04 PROCEDURE — 99213 OFFICE O/P EST LOW 20 MIN: CPT | Performed by: FAMILY MEDICINE

## 2021-12-04 PROCEDURE — 72040 X-RAY EXAM NECK SPINE 2-3 VW: CPT | Mod: TC | Performed by: FAMILY MEDICINE

## 2021-12-04 NOTE — PROGRESS NOTES
"  Subjective:      50 y.o. female presents to urgent care for neck and bilateral pain that started months ago, but worsened this week.  There was no inciting event or trauma. Pain is located midline in her neck and radiates to shoulders bilaterally, it is described as dull and achy, currently rated 2/10. She hasn't tried anything for the pain. She has never had anything like this previously. No associated weakness or numbness to upper extremities.    She denies any other questions or concerns at this time.    Current problem list, medication, and past medical/surgical history were reviewed in Epic.    ROS  See HPI     Objective:      /70 (BP Location: Right arm, Patient Position: Sitting, BP Cuff Size: Adult)   Pulse 82   Temp 36.8 °C (98.3 °F) (Temporal)   Resp 16   Ht 1.651 m (5' 5\")   Wt 68 kg (150 lb)   SpO2 96%   BMI 24.96 kg/m²     Physical Exam  Constitutional:       General: She is not in acute distress.     Appearance: She is not diaphoretic.   Cardiovascular:      Rate and Rhythm: Normal rate and regular rhythm.      Heart sounds: Normal heart sounds.   Pulmonary:      Effort: Pulmonary effort is normal. No respiratory distress.      Breath sounds: Normal breath sounds.   Musculoskeletal:      Comments: Mass at the base of her neck, it is symmetric over her spine, it is non-tender to palpation, no fluctuance noted.  Otherwise, no deformities or discolorations noted to inspection of neck and back.  No step-offs or areas of tenderness noted to palpation of her spine.  Not tender to palpation of paraspinal region.   Neurological:      Mental Status: She is alert.      Comments: Equal strength and sensation to upper extremities bilaterally.   Psychiatric:         Mood and Affect: Affect normal.         Judgment: Judgment normal.       Assessment/Plan:     1. Neck mass  2. Neck pain  Cervical x-rays showing mild cervical kyphosis centered at C4-5.  Otherwise no acute injuries noted.  Likely a " strain or sprain use heat, Tylenol, ibuprofen as needed for symptomatic relief I have sent in a referral to establish care with PCP.  - DX-CERVICAL SPINE-2 OR 3 VIEWS; Future  - Referral to establish with [facility]CP      Instructed to return to Urgent Care or nearest Emergency Department if symptoms fail to improve, for any change in condition, further concerns, or new concerning symptoms. Patient states understanding of the plan of care and discharge instructions.    Radha Alejo M.D.

## 2021-12-04 NOTE — PATIENT INSTRUCTIONS
Back Exercises  These exercises help to make your trunk and back strong. They also help to keep the lower back flexible. Doing these exercises can help to prevent back pain or lessen existing pain.  · If you have back pain, try to do these exercises 2-3 times each day or as told by your doctor.  · As you get better, do the exercises once each day. Repeat the exercises more often as told by your doctor.  · To stop back pain from coming back, do the exercises once each day, or as told by your doctor.  Exercises  Single knee to chest  Do these steps 3-5 times in a row for each le. Lie on your back on a firm bed or the floor with your legs stretched out.  2. Bring one knee to your chest.  3. Grab your knee or thigh with both hands and hold them it in place.  4. Pull on your knee until you feel a gentle stretch in your lower back or buttocks.  5. Keep doing the stretch for 10-30 seconds.  6. Slowly let go of your leg and straighten it.  Pelvic tilt  Do these steps 5-10 times in a row:  1. Lie on your back on a firm bed or the floor with your legs stretched out.  2. Bend your knees so they point up to the ceiling. Your feet should be flat on the floor.  3. Tighten your lower belly (abdomen) muscles to press your lower back against the floor. This will make your tailbone point up to the ceiling instead of pointing down to your feet or the floor.  4. Stay in this position for 5-10 seconds while you gently tighten your muscles and breathe evenly.  Cat-cow  Do these steps until your lower back bends more easily:  1. Get on your hands and knees on a firm surface. Keep your hands under your shoulders, and keep your knees under your hips. You may put padding under your knees.  2. Let your head hang down toward your chest. Tighten (contract) the muscles in your belly. Point your tailbone toward the floor so your lower back becomes rounded like the back of a cat.  3. Stay in this position for 5 seconds.  4. Slowly lift your  head. Let the muscles of your belly relax. Point your tailbone up toward the ceiling so your back forms a sagging arch like the back of a cow.  5. Stay in this position for 5 seconds.    Press-ups  Do these steps 5-10 times in a row:  1. Lie on your belly (face-down) on the floor.  2. Place your hands near your head, about shoulder-width apart.  3. While you keep your back relaxed and keep your hips on the floor, slowly straighten your arms to raise the top half of your body and lift your shoulders. Do not use your back muscles. You may change where you place your hands in order to make yourself more comfortable.  4. Stay in this position for 5 seconds.  5. Slowly return to lying flat on the floor.    Bridges  Do these steps 10 times in a row:  1. Lie on your back on a firm surface.  2. Bend your knees so they point up to the ceiling. Your feet should be flat on the floor. Your arms should be flat at your sides, next to your body.  3. Tighten your butt muscles and lift your butt off the floor until your waist is almost as high as your knees. If you do not feel the muscles working in your butt and the back of your thighs, slide your feet 1-2 inches farther away from your butt.  4. Stay in this position for 3-5 seconds.  5. Slowly lower your butt to the floor, and let your butt muscles relax.  If this exercise is too easy, try doing it with your arms crossed over your chest.  Belly crunches  Do these steps 5-10 times in a row:  1. Lie on your back on a firm bed or the floor with your legs stretched out.  2. Bend your knees so they point up to the ceiling. Your feet should be flat on the floor.  3. Cross your arms over your chest.  4. Tip your chin a little bit toward your chest but do not bend your neck.  5. Tighten your belly muscles and slowly raise your chest just enough to lift your shoulder blades a tiny bit off of the floor. Avoid raising your body higher than that, because it can put too much stress on your low  back.  6. Slowly lower your chest and your head to the floor.  Back lifts  Do these steps 5-10 times in a row:  1. Lie on your belly (face-down) with your arms at your sides, and rest your forehead on the floor.  2. Tighten the muscles in your legs and your butt.  3. Slowly lift your chest off of the floor while you keep your hips on the floor. Keep the back of your head in line with the curve in your back. Look at the floor while you do this.  4. Stay in this position for 3-5 seconds.  5. Slowly lower your chest and your face to the floor.  Contact a doctor if:  · Your back pain gets a lot worse when you do an exercise.  · Your back pain does not get better 2 hours after you exercise.  If you have any of these problems, stop doing the exercises. Do not do them again unless your doctor says it is okay.  Get help right away if:  · You have sudden, very bad back pain. If this happens, stop doing the exercises. Do not do them again unless your doctor says it is okay.  This information is not intended to replace advice given to you by your health care provider. Make sure you discuss any questions you have with your health care provider.  Document Released: 01/20/2012 Document Revised: 09/12/2019 Document Reviewed: 09/12/2019  [email or web address] Patient Education © 2020 [email or web address]  Neck Exercises  Ask your health care provider which exercises are safe for you. Do exercises exactly as told by your health care provider and adjust them as directed. It is normal to feel mild stretching, pulling, tightness, or discomfort as you do these exercises. Stop right away if you feel sudden pain or your pain gets worse. Do not begin these exercises until told by your health care provider.  Neck exercises can be important for many reasons. They can improve strength and maintain flexibility in your neck, which will help your upper back and prevent neck pain.  Stretching exercises  Rotation neck stretching    1. Sit in a chair or stand  up.  2. Place your feet flat on the floor, shoulder width apart.  3. Slowly turn your head (rotate) to the right until a slight stretch is felt. Turn it all the way to the right so you can look over your right shoulder. Do not tilt or tip your head.  4. Hold this position for 10-30 seconds.  5. Slowly turn your head (rotate) to the left until a slight stretch is felt. Turn it all the way to the left so you can look over your left shoulder. Do not tilt or tip your head.  6. Hold this position for 10-30 seconds.  Repeat __________ times. Complete this exercise __________ times a day.  Neck retraction  1. Sit in a sturdy chair or stand up.  2. Look straight ahead. Do not bend your neck.  3. Use your fingers to push your chin backward (retraction). Do not bend your neck for this movement. Continue to face straight ahead. If you are doing the exercise properly, you will feel a slight sensation in your throat and a stretch at the back of your neck.  4. Hold the stretch for 1-2 seconds.  Repeat __________ times. Complete this exercise __________ times a day.  Strengthening exercises  Neck press  1. Lie on your back on a firm bed or on the floor with a pillow under your head.  2. Use your neck muscles to push your head down on the pillow and straighten your spine.  3. Hold the position as well as you can. Keep your head facing up (in a neutral position) and your chin tucked.  4. Slowly count to 5 while holding this position.  Repeat __________ times. Complete this exercise __________ times a day.  Isometrics  These are exercises in which you strengthen the muscles in your neck while keeping your neck still (isometrics).  1. Sit in a supportive chair and place your hand on your forehead.  2. Keep your head and face facing straight ahead. Do not flex or extend your neck while doing isometrics.  3. Push forward with your head and neck while pushing back with your hand. Hold for 10 seconds.  4. Do the sequence again, this time  putting your hand against the back of your head. Use your head and neck to push backward against the hand pressure.  5. Finally, do the same exercise on either side of your head, pushing sideways against the pressure of your hand.  Repeat __________ times. Complete this exercise __________ times a day.  Prone head lifts  1. Lie face-down (prone position), resting on your elbows so that your chest and upper back are raised.  2. Start with your head facing downward, near your chest. Position your chin either on or near your chest.  3. Slowly lift your head upward. Lift until you are looking straight ahead. Then continue lifting your head as far back as you can comfortably stretch.  4. Hold your head up for 5 seconds. Then slowly lower it to your starting position.  Repeat __________ times. Complete this exercise __________ times a day.  Supine head lifts  1. Lie on your back (supine position), bending your knees to point to the ceiling and keeping your feet flat on the floor.  2. Lift your head slowly off the floor, raising your chin toward your chest.  3. Hold for 5 seconds.  Repeat __________ times. Complete this exercise __________ times a day.  Scapular retraction  1. Stand with your arms at your sides. Look straight ahead.  2. Slowly pull both shoulders (scapulae) backward and downward (retraction) until you feel a stretch between your shoulder blades in your upper back.  3. Hold for 10-30 seconds.  4. Relax and repeat.  Repeat __________ times. Complete this exercise __________ times a day.  Contact a health care provider if:  · Your neck pain or discomfort gets much worse when you do an exercise.  · Your neck pain or discomfort does not improve within 2 hours after you exercise.  If you have any of these problems, stop exercising right away. Do not do the exercises again unless your health care provider says that you can.  Get help right away if:  · You develop sudden, severe neck pain.  If this happens, stop  exercising right away. Do not do the exercises again unless your health care provider says that you can.  This information is not intended to replace advice given to you by your health care provider. Make sure you discuss any questions you have with your health care provider.  Document Released: 11/28/2016 Document Revised: 10/16/2019 Document Reviewed: 10/16/2019  [employer] Patient Education © 2020 [employer]

## 2021-12-15 ENCOUNTER — OFFICE VISIT (OUTPATIENT)
Dept: MEDICAL GROUP | Facility: PHYSICIAN GROUP | Age: 51
End: 2021-12-15
Payer: COMMERCIAL

## 2021-12-15 VITALS
WEIGHT: 148 LBS | HEART RATE: 70 BPM | RESPIRATION RATE: 20 BRPM | DIASTOLIC BLOOD PRESSURE: 70 MMHG | HEIGHT: 65 IN | SYSTOLIC BLOOD PRESSURE: 120 MMHG | BODY MASS INDEX: 24.66 KG/M2 | OXYGEN SATURATION: 99 % | TEMPERATURE: 97.2 F

## 2021-12-15 DIAGNOSIS — Z12.31 ENCOUNTER FOR SCREENING MAMMOGRAM FOR BREAST CANCER: ICD-10-CM

## 2021-12-15 DIAGNOSIS — E55.9 HYPOVITAMINOSIS D: ICD-10-CM

## 2021-12-15 DIAGNOSIS — E78.00 HYPERCHOLESTEROLEMIA: ICD-10-CM

## 2021-12-15 DIAGNOSIS — K64.8 INTERNAL HEMORRHOID: ICD-10-CM

## 2021-12-15 DIAGNOSIS — Z13.1 ENCOUNTER FOR SCREENING FOR DIABETES MELLITUS: ICD-10-CM

## 2021-12-15 PROBLEM — Z00.00 HEALTH CARE MAINTENANCE: Status: RESOLVED | Noted: 2018-11-08 | Resolved: 2021-12-15

## 2021-12-15 PROBLEM — Z00.00 ANNUAL PHYSICAL EXAM: Status: RESOLVED | Noted: 2018-11-08 | Resolved: 2021-12-15

## 2021-12-15 PROCEDURE — 99214 OFFICE O/P EST MOD 30 MIN: CPT | Performed by: NURSE PRACTITIONER

## 2021-12-15 ASSESSMENT — PATIENT HEALTH QUESTIONNAIRE - PHQ9: CLINICAL INTERPRETATION OF PHQ2 SCORE: 0

## 2021-12-15 NOTE — PROGRESS NOTES
"Subjective  Chief Complaint  Establish care to manage her chronic conditions    History of Present Illness  Diane Goins is a 50 y.o. female. This patient is here today to establish care.  Her prior PCP was Dr. Ping Gramajo.    Hypercholesterolemia  Chronic, ongoing.  Not taking any cholesterol lowering medications.  Reports diet is \"okay\".   She is following a low-cholesterol diet.  She is not exercising regularly.  She is not taking ASA daily.  She denies dizziness, claudication, or chest pain.  Due for updated lab work.     12/10/2018 08:08   Cholesterol,Tot 209 (H)   Triglycerides 74   HDL 59    (H)       Hypovitaminosis D  Chronic, ongoing.  Reports fatigue.  Denies any muscle weakness.  No history of CKD.  Reports having a good diet, including dairy products.  No history of IBD's, celiac, CF, or surgeries causing malabsorption.  Patient is not obese.  Is taking vitamin d supplement.   Due for updated lab work.     12/10/2018 08:08   25-Hydroxy   Vitamin D 25 11 (L)       Internal hemorrhoid  Chronic and ongoing. She states that when she got her first colonoscopy she was told that she had internal hemorrhoids and that if she wanted to have them taken care of that she should go back and see the GI doctor. She states that she has noticed that when she has a bowel movement she is noticing blood and that she would like a referral again to the GI doctor to address getting them removed.    Past Medical History    Allergies: Patient has no known allergies.  Past Medical History:   Diagnosis Date   • Annual physical exam 11/8/2018   • Health care maintenance 11/8/2018   • No known health problems      History reviewed. No pertinent surgical history.  Current Outpatient Medications Ordered in Epic   Medication Sig Dispense Refill   • Probiotic Product (PROBIOTIC PO) Take  by mouth.     • MAGNESIUM PO Take  by mouth.     • VITAMIN D PO Take  by mouth.       No current Epic-ordered " "facility-administered medications on file.     Family History:    Family History   Adopted: Yes   Problem Relation Age of Onset   • Parkinson's Disease Mother    • Cancer Father       Personal/Social History:    Social History     Tobacco Use   • Smoking status: Former Smoker     Packs/day: 1.50     Types: Cigarettes     Start date: 1986     Quit date: 2010     Years since quittin.1   • Smokeless tobacco: Never Used   Vaping Use   • Vaping Use: Never used   Substance Use Topics   • Alcohol use: No   • Drug use: No     Social History     Social History Narrative   • Not on file      Review of Systems:     General: Negative for fever/chills and unexpected weight change.    Respiratory:  Negative for cough and dyspnea.     Cardiovascular:  Negative for chest pain and palpitations.   Gastrointestinal:  Negative for nausea/vomiting, changes in bowel habits, and abdominal pain.    Musculoskeletal:  Negative for myalgias.    Skin:  Negative for rash.     Objective  Physical Exam:   /70 (BP Location: Left arm, Patient Position: Sitting, BP Cuff Size: Adult)   Pulse 70   Temp 36.2 °C (97.2 °F) (Temporal)   Resp 20   Ht 1.651 m (5' 5\")   Wt 67.1 kg (148 lb)   SpO2 99%  Body mass index is 24.63 kg/m².  General:  Alert and oriented.  Well appearing.  NAD.  Head:  Normocephalic.   Neck: Supple without JVD. No lymphadenopathy.  Pulmonary:  Normal effort.  Clear to ausculation without rales, ronchi, or wheezing.  Cardiovascular:  Regular rate and rhythm without murmur, rubs or gallop.  Radial pulses are intact and equal bilaterally.  Musculoskeletal:  No extremity cyanosis, clubbing, or edema.  Skin:  Warm and dry.  No obvious lesions.  Psych: Normal mood and affect. Alert and oriented x3. Judgment and insight is normal.      Assessment/Plan   1. Internal hemorrhoid  Chronic and ongoing.  She is requesting a referral for GI to discuss options of treating her internal hemorrhoid, referral placed at this " time.  - Referral to Gastroenterology    2. Hypercholesterolemia  Chronic and ongoing.  Educated on a healthy diet and exercise.  Due for updated labs.  - Lipid Profile; Future    3. Hypovitaminosis D  Chronic and ongoing.  Continue to take Vitamin D supplement.  Due for updated labs.  - VITAMIN D,25 HYDROXY; Future    4. Encounter for screening for diabetes mellitus  Due for updated labs.  - Comp Metabolic Panel; Future    5. Encounter for screening mammogram for breast cancer  - MA-SCREENING MAMMO BILAT W/TOMOSYNTHESIS W/CAD; Future      Health Maintenance: Completed    Return if symptoms worsen or fail to improve.    Please note that this dictation was created using voice recognition software. I have made every reasonable attempt to correct obvious errors, but I expect that there are errors of grammar and possibly content that I did not discover before finalizing the note.    JESI Borjas  [facility]

## 2021-12-15 NOTE — ASSESSMENT & PLAN NOTE
Chronic and ongoing. She states that when she got her first colonoscopy she was told that she had internal hemorrhoids and that if she wanted to have them taken care of that she should go back and see the GI doctor. She states that she has noticed that when she has a bowel movement she is noticing blood and that she would like a referral again to the GI doctor to address getting them removed.

## 2021-12-15 NOTE — ASSESSMENT & PLAN NOTE
"Chronic, ongoing.  Not taking any cholesterol lowering medications.  Reports diet is \"okay\".   She is following a low-cholesterol diet.  She is not exercising regularly.  She is not taking ASA daily.  She denies dizziness, claudication, or chest pain.  Due for updated lab work.     12/10/2018 08:08   Cholesterol,Tot 209 (H)   Triglycerides 74   HDL 59    (H)     "

## 2021-12-15 NOTE — ASSESSMENT & PLAN NOTE
Chronic, ongoing.  Reports fatigue.  Denies any muscle weakness.  No history of CKD.  Reports having a good diet, including dairy products.  No history of IBD's, celiac, CF, or surgeries causing malabsorption.  Patient is not obese.  Is taking vitamin d supplement.   Due for updated lab work.     12/10/2018 08:08   25-Hydroxy   Vitamin D 25 11 (L)

## 2023-03-01 NOTE — PROGRESS NOTES
"Subjective:      Diane Goins is a 48 y.o. female who presents with Elbow Problem (x 4 days.  Pt. noticed some pain on R elbow on Tuesday night that woke her up.  Today she looked at the elbow and it has a lump.  She is unsure what happened but has a previous R shoulder injury that may be related. )      - This is a pleasant and non toxic appearing 48 y.o. female with c/o ongoing Rt upper back pain x 9 months. No specific injury, Rt arm pain as well for past 9 months and in past week has some Rt elbow pain swelling. Worse movement, better rest             ALLERGIES:  Patient has no known allergies.     PMH:  Past Medical History:   Diagnosis Date   • No known health problems         PSH:  History reviewed. No pertinent surgical history.    MEDS:    Current Outpatient Medications:   •  celecoxib (CELEBREX) 200 MG Cap, Take 1 Cap by mouth every day., Disp: 14 Cap, Rfl: 0  •  cyclobenzaprine (FLEXERIL) 10 MG Tab, Take 1 Tab by mouth 3 times a day as needed., Disp: 30 Tab, Rfl: 0  •  ergocalciferol (DRISDOL) [address]0849 UNIT capsule, TAKE ONE CAPSULE BY MOUTH EVERY 7 DAYS, Disp: 4 Cap, Rfl: 0    ** I have documented what I find to be significant in regards to past medical, social, family and surgical history  in my HPI or under PMH/PSH/FH review section, otherwise it is contributory **           HPI    Review of Systems   Musculoskeletal: Positive for joint pain.   All other systems reviewed and are negative.         Objective:     /72   Pulse 85   Temp 36.9 °C (98.5 °F) (Temporal)   Resp 12   Ht 1.651 m (5' 5\")   Wt 65.9 kg (145 lb 3.2 oz)   LMP 08/30/2019   SpO2 99%   BMI 24.16 kg/m²      Physical Exam   Constitutional: She appears well-developed and well-nourished. No distress.   HENT:   Head: Normocephalic and atraumatic.   Eyes: Conjunctivae are normal.   Cardiovascular: Normal heart sounds.   No murmur heard.  Pulmonary/Chest: Effort normal and breath sounds normal. No respiratory distress. " Consultation - Graham Swanson 30 y o  female MRN: [id number]    Unit/Bed#: 2 [place] [address] Encounter: [id number]      Identifying Data: 30years old white female is admitted at [address] [place] [address] on February 28, 2023 with complaining of seizure psychiatric consultation is asked for alcohol abuse dependent patient examined spoke with the nurse history physical medications labs reviewed and noted spoke to the medical attending Dr Holly Long yesterday  I came over to see the patient today patient was alcohol level was less than 10 and drug screen was negative patient is not pregnant  Reportedly patient is drinking heavily every night until she falls asleep and she gives a history of drinking at least last 5 years she tried to stop on her own working with her family physician and she tried taking Ambien Vistaril Xanax and she was prescribed Lexapro 10 mg daily a year ago in the beginning she was not taking it but recently she started taking it on a daily basis nothing worked and she is still drinking after the evaluation and pointing out her addiction problem with alcohol she understood and agreed that she does have drinking problem and I recommended to consider inpatient alcohol rehab and then she will need outpatient follow-up with a psychiatrist therapist and alcohol counseling she agreed  Currently patient is not shaky she is started on Librium to prevent the DTs  Neurology consult also ordered for the seizure because this is the first time she had a seizure according to the information  I reviewed her home medications she was treated with Lexapro and Vistaril by the family physician      Social history patient is single she lives with a mother and stepdad she has no children she broke up with boyfriend couple of months ago it was a mutual break-up patient's stop smoking cigarettes and now she is vaping and she drinks alcohol every day as described above she gives a history of at least 1 DUI in the past and she   Musculoskeletal: She exhibits edema and tenderness.        Arms:  Neurological: She is alert. She exhibits normal muscle tone.   Skin: Skin is warm and dry. She is not diaphoretic.   Psychiatric: She has a normal mood and affect. Judgment normal.   Nursing note and vitals reviewed.              Assessment/Plan:         1. Back muscle spasm  celecoxib (CELEBREX) 200 MG Cap    cyclobenzaprine (FLEXERIL) 10 MG Tab    REFERRAL TO [facility]TS [facility]   2. Pain of right upper extremity  DX-HUMERUS 2+ RIGHT    celecoxib (CELEBREX) 200 MG Cap    REFERRAL TO SPORTS MEDICINE   3. Olecranon bursitis of right elbow  DX-HUMERUS 2+ RIGHT    celecoxib (CELEBREX) 200 MG Cap    REFERRAL TO SPORTS MEDICINE       - rest/hydrate       Dx & d/c instructions discussed w/ patient and/or family members.     Follow up with PCP (or here if PCP unavailable) in 2-3 days if symptoms not improving, ER if feeling/getting worse.    Any realistic and/or common medication side effects that may have been given today(i.e. Rash, GI upset/constipation, sedation, elevation of BP or blood sugars) reviewed.     Patient left in stable condition             went to the 12-step program but no inpatient rehab and she is not attending the [place] [id number] meetings or counseling she was offered employee assistance program for counseling recently and she had seen a therapist   Patient has college education and she has been working at [employer] since 1 year    Allergy no known drug allergy    Diagnosis  Major depression recurrent  Alcohol abuse dependent  Anxiety  Insomnia  No medical or surgical history on file    Chief Complaints: Depression anxiety and alcohol abuse dependent    Family History: No family history on file  Patient denies    Legal History: Patient denies    Mental Status Exam: 30years old white female is alert awake oriented x3 cooperative not lethargic not shaky memory intact communicate her feelings well and she was able to give out the basic background information she admits having a drinking problem she feels depressed and chronic insomnia she gets anxiety  Appetite okay  Patient denies auditory or visual hallucination  Patient denies suicidal or homicidal ideation  No paranoia no delusion elucidated poor concentration  Insight and judgment are adequate  History of Present Illness     HPI: Karina Sears is a 30y o  year old female who presents with seizure and alcohol abuse    Inpatient consult to Psychiatry  Consult performed by: Janene Nation MD  Consult ordered by: Yessica Castro MD            Historical Information   Past Psychiatric History: Patient gives a history of depression anxiety and insomnia and she has been treated by the family physician by different medications she was prescribed Lexapro a year ago and she was not taking it but recently she started taking it on a daily basis she tried Vistaril Xanax Ambien to sleep and stop drinking but nothing worked and she has been abusing alcohol she gives a history of 1 DUI in the past she does not go for [facility] or alcohol drug counseling and she is still drinking on a daily basis  Patient denies drug abuse  Patient has not seen a psychiatrist no psychiatric admissions no suicide attempts in the past currently she is on Lexapro 10 mg daily and Vistaril as needed by the family physician she is not under psychiatric care  No past medical history on file  No past surgical history on file  Social History   Social History     Substance and Sexual Activity   Alcohol Use Not Currently    Comment: stop 2 weeks ago     Social History     Substance and Sexual Activity   Drug Use Yes   • Types: Other, Marijuana    Comment: edibles     Social History     Tobacco Use   Smoking Status Former   • Types: Cigarettes   • Quit date:    • Years since quittin 1   Smokeless Tobacco Never       Meds/Allergies   current meds:   Current Facility-Administered Medications   Medication Dose Route Frequency   • chlordiazePOXIDE (LIBRIUM) capsule 25 mg  25 mg Oral Q8H [address]   • diphenhydrAMINE (BENADRYL) tablet 12 5 mg  12 5 mg Oral Q0A PRN   • folic acid (FOLVITE) tablet 1 mg  1 mg Oral Daily   • melatonin tablet 6 mg  6 mg Oral HS   • multivitamin-minerals (CENTRUM) tablet 1 tablet  1 tablet Oral Daily   • thiamine tablet 100 mg  100 mg Oral Daily    and PTA meds:    Medications Prior to Admission   Medication   • escitalopram (Lexapro) 10 mg tablet   • hydrOXYzine pamoate (VISTARIL) 50 mg capsule     No Known Allergies    Objective   Vitals: Blood pressure 107/69, pulse 90, temperature 98 5 °F (36 9 °C), temperature source Oral, resp  rate 18, height 5' 4" (1 626 m), weight 63 5 kg (140 lb), SpO2 96 %        Routine Lab Results:   Admission on 2023   Component Date Value Ref Range Status   • WBC 2023 8 31  4 31 - 10 16 Thousand/uL Final   • RBC 2023 4 46  3 81 - 5 12 Million/uL Final   • Hemoglobin 2023 14 2  11 5 - 15 4 g/dL Final   • Hematocrit 2023 43 6  34 8 - 46 1 % Final   • MCV 2023 98  82 - 98 fL Final   • MCH 2023 31 8  26 8 - 34 3 pg Final   • MCHC 2023 32 6  31 4 - 37 4 g/dL Final   • RDW 02/28/2023 13 4  11 6 - 15 1 % Final   • MPV 02/28/2023 10 2  8 9 - 12 7 fL Final   • Platelets 80/69/5317 237  149 - 390 Thousands/uL Final   • nRBC 02/28/2023 0  /100 WBCs Final   • Neutrophils Relative 02/28/2023 50  43 - 75 % Final   • Immat GRANS % 02/28/2023 0  0 - 2 % Final   • Lymphocytes Relative 02/28/2023 37  14 - 44 % Final   • Monocytes Relative 02/28/2023 11  4 - 12 % Final   • Eosinophils Relative 02/28/2023 1  0 - 6 % Final   • Basophils Relative 02/28/2023 1  0 - 1 % Final   • Neutrophils Absolute 02/28/2023 4 16  1 85 - 7 62 Thousands/µL Final   • Immature Grans Absolute 02/28/2023 0 02  0 00 - 0 20 Thousand/uL Final   • Lymphocytes Absolute 02/28/2023 3 07  0 60 - 4 47 Thousands/µL Final   • Monocytes Absolute 02/28/2023 0 95  0 17 - 1 22 Thousand/µL Final   • Eosinophils Absolute 02/28/2023 0 04  0 00 - 0 61 Thousand/µL Final   • Basophils Absolute 02/28/2023 0 07  0 00 - 0 10 Thousands/µL Final   • Protime 02/28/2023 12 7  11 6 - 14 5 seconds Final   • INR 02/28/2023 0 94  0 84 - 1 19 Final   • PTT 02/28/2023 27  23 - 37 seconds Final    Therapeutic Heparin Range =  60-90 seconds   • Sodium 02/28/2023 139  135 - 147 mmol/L Final   • Potassium 02/28/2023 5 0  3 5 - 5 3 mmol/L Final   • Chloride 02/28/2023 105  96 - 108 mmol/L Final   • CO2 02/28/2023 28  21 - 32 mmol/L Final   • ANION GAP 02/28/2023 6  4 - 13 mmol/L Final   • BUN 02/28/2023 6  5 - 25 mg/dL Final   • Creatinine 02/28/2023 0 88  0 60 - 1 30 mg/dL Final    Standardized to IDMS reference method   • Glucose 02/28/2023 101  65 - 140 mg/dL Final    If the patient is fasting, the ADA then defines impaired fasting glucose as > 100 mg/dL and diabetes as > or equal to 123 mg/dL  Specimen collection should occur prior to Sulfasalazine administration due to the potential for falsely depressed results   Specimen collection should occur prior to Sulfapyridine administration due to the potential for falsely elevated results  • Calcium 02/28/2023 9 3  8 4 - 10 2 mg/dL Final   • AST 02/28/2023 41 (H)  13 - 39 U/L Final    Specimen collection should occur prior to Sulfasalazine administration due to the potential for falsely depressed results  • ALT 02/28/2023 43  7 - 52 U/L Final    Specimen collection should occur prior to Sulfasalazine administration due to the potential for falsely depressed results  • Alkaline Phosphatase 02/28/2023 42  34 - 104 U/L Final   • Total Protein 02/28/2023 6 8  6 4 - 8 4 g/dL Final   • Albumin 02/28/2023 4 2  3 5 - 5 0 g/dL Final   • Total Bilirubin 02/28/2023 0 37  0 20 - 1 00 mg/dL Final   • eGFR 02/28/2023 87  ml/min/1 73sq m Final   • TSH 3RD GENERATON 02/28/2023 2 177  0 450 - 4 500 uIU/mL Final    The recommended reference ranges for TSH during pregnancy are as follows:   First trimester 0 1 to 2 5 uIU/mL   Second trimester  0 2 to 3 0 uIU/mL   Third trimester 0 3 to 3 0 uIU/m    Note: Normal ranges may not apply to patients who are transgender, non-binary, or whose legal sex, sex at birth, and gender identity differ  Adult TSH (3rd generation) reference range follows the recommended guidelines of the American Thyroid Association, January, 2020     • Amph/Meth UR 02/28/2023 Negative  Negative Final   • Barbiturate Ur 02/28/2023 Negative  Negative Final   • Benzodiazepine Urine 02/28/2023 Negative  Negative Final   • Cocaine Urine 02/28/2023 Negative  Negative Final   • Methadone Urine 02/28/2023 Negative  Negative Final   • Opiate Urine 02/28/2023 Negative  Negative Final   • PCP Ur 02/28/2023 Negative  Negative Final   • THC Urine 02/28/2023 Negative  Negative Final   • Oxycodone Urine 02/28/2023 Negative  Negative Final   • Color, UA 02/28/2023 Light Yellow   Final   • Clarity, UA 02/28/2023 Clear   Final   • Specific Gravity, UA 02/28/2023 1 010  1 000 - 1 030 Final   • pH, UA 02/28/2023 7 0  5 0, 5 5, 6 0, 6 5, 7 0, 7 5, 8 0, 8 5, 9 0 Final   • Leukocytes, UA 02/28/2023 Negative  Negative Final   • Nitrite, UA 02/28/2023 Negative  Negative Final   • Protein, UA 02/28/2023 Negative  Negative mg/dl Final   • Glucose, UA 02/28/2023 Negative  Negative mg/dl Final   • Ketones, UA 02/28/2023 Negative  Negative mg/dl Final   • Urobilinogen, UA 02/28/2023 0 2  0 2, 1 0 E U /dl E U /dl Final   • Bilirubin, UA 02/28/2023 Negative  Negative Final   • Occult Blood, UA 02/28/2023 Negative  Negative Final   • hs TnI 0hr 02/28/2023 3  "Refer to ACS Flowchart"- see link ng/L Final    Comment:                                              Initial (time 0) result  If >=50 ng/L, Myocardial injury suggested ;  Type of myocardial injury and treatment strategy  to be determined  If 5-49 ng/L, a delta result at 2 hours and or 4 hours will be needed to further evaluate  If <4 ng/L, and chest pain has been >3 hours since onset, patient may qualify for discharge based on the HEART score in the ED  If <5 ng/L and <3hours since onset of chest pain, a delta result at 2 hours will be needed to further evaluate  HS Troponin 99th Percentile URL of a Health Population=12 ng/L with a 95% Confidence Interval of 8-18 ng/L  Second Troponin (time 2 hours)  If calculated delta >= 20 ng/L,  Myocardial injury suggested ; Type of myocardial injury and treatment strategy to be determined  If 5-49 ng/L and the calculated delta is 5-19 ng/L, consult medical service for evaluation  Continue evaluation for ischemia on ecg and other possible etiology and repeat hs troponin at 4 hours  If delta                            is <5 ng/L at 2 hours, consider discharge based on risk stratification via the HEART score (if in ED), or BRADFORD risk score in IP/Observation  HS Troponin 99th Percentile URL of a Health Population=12 ng/L with a 95% Confidence Interval of 8-18 ng/L     • SARS-CoV-2 02/28/2023 Negative  Negative Final   • INFLUENZA A PCR 02/28/2023 Negative  Negative Final   • INFLUENZA B PCR 02/28/2023 Negative Negative Final   • RSV PCR 02/28/2023 Negative  Negative Final   • EXT Preg Test, Ur 02/28/2023 Negative   Final   • Control 02/28/2023 Valid   Final   • Ethanol Lvl 02/28/2023 <10  <10 mg/dL Final   • Salicylate Lvl 38/62/2785 <5  3 - 20 mg/dL Final   • Acetaminophen Level 02/28/2023 <10 (L)  10 - 20 ug/mL Final   • hs TnI 2hr 02/28/2023 6  "Refer to ACS Flowchart"- see link ng/L Final    Comment:                                              Initial (time 0) result  If >=50 ng/L, Myocardial injury suggested ;  Type of myocardial injury and treatment strategy  to be determined  If 5-49 ng/L, a delta result at 2 hours and or 4 hours will be needed to further evaluate  If <4 ng/L, and chest pain has been >3 hours since onset, patient may qualify for discharge based on the HEART score in the ED  If <5 ng/L and <3hours since onset of chest pain, a delta result at 2 hours will be needed to further evaluate  HS Troponin 99th Percentile URL of a Health Population=12 ng/L with a 95% Confidence Interval of 8-18 ng/L  Second Troponin (time 2 hours)  If calculated delta >= 20 ng/L,  Myocardial injury suggested ; Type of myocardial injury and treatment strategy to be determined  If 5-49 ng/L and the calculated delta is 5-19 ng/L, consult medical service for evaluation  Continue evaluation for ischemia on ecg and other possible etiology and repeat hs troponin at 4 hours  If delta                            is <5 ng/L at 2 hours, consider discharge based on risk stratification via the HEART score (if in ED), or BRADFORD risk score in IP/Observation  HS Troponin 99th Percentile URL of a Health Population=12 ng/L with a 95% Confidence Interval of 8-18 ng/L     • Delta 2hr hsTnI 02/28/2023 3  <20 ng/L Final   • Sodium 03/01/2023 141  135 - 147 mmol/L Final   • Potassium 03/01/2023 3 8  3 5 - 5 3 mmol/L Final   • Chloride 03/01/2023 109 (H)  96 - 108 mmol/L Final   • CO2 03/01/2023 27  21 - 32 mmol/L Final   • ANION GAP 03/01/2023 5  4 - 13 mmol/L Final   • BUN 03/01/2023 11  5 - 25 mg/dL Final   • Creatinine 03/01/2023 0 84  0 60 - 1 30 mg/dL Final    Standardized to IDMS reference method   • Glucose 03/01/2023 85  65 - 140 mg/dL Final    If the patient is fasting, the ADA then defines impaired fasting glucose as > 100 mg/dL and diabetes as > or equal to 123 mg/dL  Specimen collection should occur prior to Sulfasalazine administration due to the potential for falsely depressed results  Specimen collection should occur prior to Sulfapyridine administration due to the potential for falsely elevated results  • Glucose, Fasting 03/01/2023 85  65 - 99 mg/dL Final    Specimen collection should occur prior to Sulfasalazine administration due to the potential for falsely depressed results  Specimen collection should occur prior to Sulfapyridine administration due to the potential for falsely elevated results  • Calcium 03/01/2023 8 6  8 4 - 10 2 mg/dL Final   • AST 03/01/2023 31  13 - 39 U/L Final    Specimen collection should occur prior to Sulfasalazine administration due to the potential for falsely depressed results  • ALT 03/01/2023 32  7 - 52 U/L Final    Specimen collection should occur prior to Sulfasalazine administration due to the potential for falsely depressed results      • Alkaline Phosphatase 03/01/2023 36  34 - 104 U/L Final   • Total Protein 03/01/2023 5 9 (L)  6 4 - 8 4 g/dL Final   • Albumin 03/01/2023 3 6  3 5 - 5 0 g/dL Final   • Total Bilirubin 03/01/2023 0 27  0 20 - 1 00 mg/dL Final   • eGFR 03/01/2023 92  ml/min/1 73sq m Final   • Magnesium 03/01/2023 1 8 (L)  1 9 - 2 7 mg/dL Final         Diagnosis: Major depression recurrent  Anxiety  Alcohol abuse dependent    Plan: I recommended inpatient alcohol rehab after the medical clearance patient agreed  I will start her Lexapro 10 mg daily  Continue Librium 25 mg 3 times daily  Psychotherapy  Spoke to the medical attending Dr Anthony Costa yesterday and nurse  GUANAKITO will follow-up during the hospital stay      Yosef Bob MD

## 2023-10-20 ENCOUNTER — HOSPITAL ENCOUNTER (EMERGENCY)
Facility: MEDICAL CENTER | Age: 53
End: 2023-10-20
Attending: EMERGENCY MEDICINE
Payer: COMMERCIAL

## 2023-10-20 VITALS
HEART RATE: 88 BPM | SYSTOLIC BLOOD PRESSURE: 143 MMHG | HEIGHT: 65 IN | OXYGEN SATURATION: 94 % | TEMPERATURE: 98.8 F | BODY MASS INDEX: 22.99 KG/M2 | WEIGHT: 138.01 LBS | DIASTOLIC BLOOD PRESSURE: 73 MMHG | RESPIRATION RATE: 15 BRPM

## 2023-10-20 DIAGNOSIS — F43.21 SITUATIONAL DEPRESSION: ICD-10-CM

## 2023-10-20 DIAGNOSIS — R45.851 SUICIDAL IDEATION: ICD-10-CM

## 2023-10-20 LAB
AMPHET UR QL SCN: NEGATIVE
BARBITURATES UR QL SCN: NEGATIVE
BENZODIAZ UR QL SCN: NEGATIVE
BZE UR QL SCN: NEGATIVE
CANNABINOIDS UR QL SCN: NEGATIVE
FENTANYL UR QL: NEGATIVE
METHADONE UR QL SCN: NEGATIVE
OPIATES UR QL SCN: NEGATIVE
OXYCODONE UR QL SCN: NEGATIVE
PCP UR QL SCN: NEGATIVE
POC BREATHALIZER: 0 PERCENT (ref 0–0.01)
PROPOXYPH UR QL SCN: NEGATIVE

## 2023-10-20 PROCEDURE — 302970 POC BREATHALIZER: Performed by: EMERGENCY MEDICINE

## 2023-10-20 PROCEDURE — 90791 PSYCH DIAGNOSTIC EVALUATION: CPT

## 2023-10-20 PROCEDURE — 80307 DRUG TEST PRSMV CHEM ANLYZR: CPT

## 2023-10-20 PROCEDURE — 99284 EMERGENCY DEPT VISIT MOD MDM: CPT

## 2023-10-20 ASSESSMENT — PAIN DESCRIPTION - PAIN TYPE: TYPE: ACUTE PAIN
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
## 2023-10-20 NOTE — ED NOTES
Bedside report given to SONALI Avitia. Belongings in bag, pt and aunt on the phone updated on SI precautions/ER process. Pt and aunt verbalized understanding. Pt room stripped of dangerous possessions. Pt in paper scrubs. Sitter in direct view of pt.

## 2023-10-20 NOTE — ED NOTES
Alert team at bedside, sitter outside room in direct line of sight to the pt, no signs of distress noted at this time, will continue to monitor.

## 2023-10-20 NOTE — CONSULTS
"[facility]   TRIAGE ASSESSMENT    Name: Diane Goins  MRN: [id number]  : 1970  Age: 52 y.o.  Date of assessment: 10/20/2023  PCP: Ping Gramajo M.D.  Persons in attendance: Patient  Patient Location: [facility]    CHIEF COMPLAINT/PRESENTING ISSUE (as stated by patient): 52 year old female BIB self early this morning d/t passing suicidal ideation to jump off of a parking garage; legal hold;  pt alert, oriented x 4; anxious but cooperative; pleasant; with scattered thoughts but organized behaviors; no delusions, paranoia, hallucinations noted; insight, judgment adequate; currently denies SI, HI, or self-harm ideation; future-oriented; states she has been emotionally overwhelmed due to work issues r/t her boss and feeling intimidated by him as he has told her she is \"worthless\" and \"no good\"; also states she had thoughts of having an intimate relationship with her boss but did not act on these thoughts; she is afraid she has had \"infidelity\" towards her  r/t this; states she was at work as a  at a [employer] last night, felt overwhelmed and \"shamed,\" told her boss she was going to leave d/t not feeling well; she called her sister who encouraged her to go the hospital, which she did; denies h/o self-harm or suicide attempt; denies outpt MH providers, psychiatric medications, or h/o in pt MH/CD tx;  current substance use includes THC occasionally with last use 10/17/23; pt is employed at a [employer] x 20 years; lives with her  of 34 years and her pet dog; no acute MH crisis noted at this time; she is actively participating in safe DC planning    Writer RN spoke to pt's aunt, Kelsey Dumont, [phone], on the phone at pt's bedside who denied safety concerns with pt DC to home today      Chief Complaint   Patient presents with    Suicidal Ideation     Pt states \"I want to die\". Pt states \"I would go get some heroin\". Hx previous SI, no SA.  \"I just want " "help. I don't want to deal with these questions\".         CURRENT LIVING SITUATION/SOCIAL SUPPORT/FINANCIAL RESOURCES:pt is employed at a [employer] x 20 years; lives with her  of 34 years and her pet dog    BEHAVIORAL HEALTH/SUBSTANCE USE TREATMENT HISTORY  Does patient/parent report a history of prior behavioral health/substance use treatment for patient?   No:    SAFETY ASSESSMENT - SELF  Does patient acknowledge current or past symptoms of dangerousness to self or is previous history noted? Yes-early this morning d/t passing suicidal ideation to jump off of a parking garage  Does parent/significant other report patient has current or past symptoms of dangerousness to self? N\A  Does presenting problem suggest symptoms of dangerousness to self? Yes:     Past Current    Suicidal Thoughts: [x]  []    Suicidal Plans: [x]  []    Suicidal Intent: []  []    Suicide Attempts: []  []    Self-Injury []  []      For any boxes checked above, provide detail: early this morning d/t passing suicidal ideation to jump off of a parking garage    History of suicide by family member: no  History of suicide by friend/significant other: no  Recent change in frequency/specificity/intensity of suicidal thoughts or self-harm behavior? yes - earlier this AM  Current access to firearms, medications, or other identified means of suicide/self-harm? no  If yes, willing to restrict access to means of suicide/self-harm? yes  Protective factors present:  Fear of suicide, Future-oriented, Good impulse control, Hopefulness, Moral objection to suicide, Optimism, Positive coping skills, Positive self-efficacy, Reasons for living identified by patient: family, Spiritual beliefs/practices, Strong family connections, Strong socia/community connections, and Willing to address in treatment    SAFETY ASSESSMENT - OTHERS  Does patient acknowledge current or past symptoms of aggressive behavior or risk to others or is previous history noted? no  Does " "parent/significant other report patient has current or past symptoms of aggressive behavior or risk to others?  N\A  Does presenting problem suggest symptoms of dangerousness to others? No    LEGAL HISTORY  Does patient acknowledge history of arrest/skilled nursing/retirement or is previous history noted? no    Crisis Safety Plan completed and copy given to patient? No-pt Dc'd prior to completing    ABUSE/NEGLECT SCREENING  Does patient report feeling “unsafe” in his/her home, or afraid of anyone?  no  Does patient report any history of physical, sexual, or emotional abuse?  no  Does parent or significant other report any of the above? N\A  Is there evidence of neglect by self?  no  Is there evidence of neglect by a caregiver? no  Does the patient/parent report any history of CPS/APS/police involvement related to suspected abuse/neglect or domestic violence? no  Based on the information provided during the current assessment, is a mandated report of suspected abuse/neglect being made?  No    SUBSTANCE USE SCREENING  Yes:  Seven all substances used in the past 30 days:      Last Use Amount   []   Alcohol     [x]   Marijuana 10/17/23 Occasional use   []   Heroin     []   Prescription Opioids  (used without prescription, for    recreation, or in excess of prescribed amount)     []   Other Prescription  (used without prescription, for    recreation, or in excess of prescribed amount)     []   Cocaine      []   Methamphetamine     []   \"\" drugs (ectasy, MDMA)     []   Other substances        UDS results: negative  Breathalyzer results: negative    What consequences does the patient associate with any of the above substance use and or addictive behaviors? None    Risk factors for detox (check all that apply):  []  Seizures   []  Diaphoretic (sweating)   []  Tremors   []  Hallucinations   []  Increased blood pressure   []  Decreased blood pressure   []  Other   []  None      [] Patient education on risk factors for detoxification " and instructed to return to ER as needed.      MENTAL STATUS   Participation: Active verbal participation, Attentive, Engaged, and Open to feedback  Grooming: Casual and Neat  Orientation: Alert and Fully Oriented  Behavior:  anxious  Eye contact: Good  Mood: Anxious  Affect: Flexible, Full range, and tearful at times  Thought process: Logical, Goal-directed, and Circumstantial  Thought content: Within normal limits  Speech: Rate within normal limits and Volume within normal limits  Perception: Within normal limits  Memory:  No gross evidence of memory deficits  Insight: Adequate  Judgment:  Adequate  Other:    Collateral information:   Source:  [] Significant other present in person:   [] Significant other by telephone  [] [facility]   [x] [facility] Nursing Staff  [x] [facility] Medical Record  [] Other:     [] Unable to complete full assessment due to:  [] Acute intoxication  [] Patient declined to participate/engage  [] Patient verbally unresponsive  [] Significant cognitive deficits  [] Significant perceptual distortions or behavioral disorganization  [] Other:      CLINICAL IMPRESSIONS:  Primary:  increased anxiety with passing suicidal ideation  Secondary:  employment stressors       IDENTIFIED NEEDS/PLAN:  [Trigger DISPOSITION list for any items marked]    []  Imminent safety risk - self [] Imminent safety risk - others   []  Acute substance withdrawal []  Psychosis/Impaired reality testing   [x]  Mood/anxiety []  Substance use/Addictive behavior   []  Maladaptive behaviro []  Parent/child conflict   []  Family/Couples conflict []  Biomedical   []  Housing [x]  Financial   []   Legal  Occupational/Educational   []  Domestic violence []  Other:     Recommended Plan of Care:  Refer to [facility] Crisis call line; writer RN reviewed community  resources with  pt, with written information given; pt gave verbal consent to send her referral to [facility]  Behavioral Health which writer RN did ; pt to DC to self to home today with transport by her     Has the Recommended Plan of Care/Level of Observation been reviewed with the patient's assigned nurse? yes    Does patient/parent or guardian express agreement with the above plan? yes    Referral appointment(s) scheduled? no    Alert team only:   I have discussed findings and recommendations with Dr. Vázquez who is in agreement with these recommendations. Legal hold DC'd    Referral information sent to the following outpatient community providers :[facility]     Referral information sent to the following inpatient community providers :no    If applicable : Referred  to  Alert Team for legal hold follow up at (time): LOBO Tang R.N.  10/20/2023

## 2023-10-20 NOTE — ED NOTES
Erp at bedside talking to pt, no signs of distress noted at this time, sitter outside room in direct line of sight to the pt, will continue to monitor.

## 2023-10-20 NOTE — ED NOTES
Report received at bedside from SONALI Avitia.    Patient A & O x 4, GCS 15. Calm, cooperative, and conversing appropriately with ED staff. All belongings returned to patient by SONALI Avitia in preparation for discharge as per orders. 1:1 observation maintained by sitter at bedside.

## 2023-10-20 NOTE — ED NOTES
Pt laying in bed, eyes closed, equal chest rise noted, no signs of distress noted at this time, sitter outside room in direct line of sight to the pt, will continue to monitor

## 2023-10-20 NOTE — ED NOTES
Pt laying in bed, no signs of distress noted at this time, sitter outside room in direct line of sight to the pt, will continue to monitor

## 2023-10-20 NOTE — ED NOTES
Bedside report received from off going RN/tech: Natalie, assumed care of patient.  POC discussed with patient. Call light within reach, all needs addressed at this time.       Fall risk interventions in place: Not Applicable (all applicable per [facility] Fall risk assessment)   Continuous monitoring: Not Applicable   IVF/IV medications: Not Applicable   Oxygen: Room Air  Bedside sitter: Pt on L2k SI with 1:1 sitter Sylwia (name)  Isolation: Not Applicable

## 2023-10-20 NOTE — ED TRIAGE NOTES
"Chief Complaint   Patient presents with    Suicidal Ideation     Pt states \"I want to die\". Pt states \"I would go get some heroin\". Hx previous SI, no SA.  \"I just want help. I don't want to deal with these questions\".      BP (!) 185/108   Pulse (!) 105   Temp 36.2 °C (97.2 °F) (Temporal)   Resp 18   Ht 1.651 m (5' 5\")   Wt 62.6 kg (138 lb 0.1 oz)   SpO2 96%   BMI 22.97 kg/m²     Pt Aunt on phone to support pt. Pt tearful during triage.   Pt roomed immediately.  "

## 2023-10-20 NOTE — ED NOTES
Pt inquiring when psych nurse will talk to her, pt advised alert team is aware is her being here, alert team will talk to her when they have time to do her evaluation.  Sitter outside room in direct line of sight to the pt, no signs of distress noted at this time, will continue to monitor.